# Patient Record
Sex: FEMALE | Race: ASIAN | NOT HISPANIC OR LATINO | URBAN - METROPOLITAN AREA
[De-identification: names, ages, dates, MRNs, and addresses within clinical notes are randomized per-mention and may not be internally consistent; named-entity substitution may affect disease eponyms.]

---

## 2018-03-24 ENCOUNTER — EMERGENCY (EMERGENCY)
Facility: HOSPITAL | Age: 38
LOS: 1 days | Discharge: HOME | End: 2018-03-24
Attending: EMERGENCY MEDICINE
Payer: COMMERCIAL

## 2018-03-24 VITALS
HEART RATE: 90 BPM | SYSTOLIC BLOOD PRESSURE: 108 MMHG | RESPIRATION RATE: 17 BRPM | OXYGEN SATURATION: 98 % | TEMPERATURE: 98 F | DIASTOLIC BLOOD PRESSURE: 73 MMHG

## 2018-03-24 VITALS
OXYGEN SATURATION: 98 % | RESPIRATION RATE: 18 BRPM | TEMPERATURE: 99 F | HEART RATE: 78 BPM | DIASTOLIC BLOOD PRESSURE: 87 MMHG | SYSTOLIC BLOOD PRESSURE: 134 MMHG

## 2018-03-24 LAB
ALBUMIN SERPL ELPH-MCNC: 3.8 G/DL — SIGNIFICANT CHANGE UP (ref 3.5–5)
ALP SERPL-CCNC: 82 U/L — SIGNIFICANT CHANGE UP (ref 40–120)
ALT FLD-CCNC: 32 U/L DA — SIGNIFICANT CHANGE UP (ref 10–60)
ANION GAP SERPL CALC-SCNC: 10 MMOL/L — SIGNIFICANT CHANGE UP (ref 5–17)
APPEARANCE UR: CLEAR — SIGNIFICANT CHANGE UP
AST SERPL-CCNC: 12 U/L — SIGNIFICANT CHANGE UP (ref 10–40)
BASOPHILS # BLD AUTO: 0.1 K/UL — SIGNIFICANT CHANGE UP (ref 0–0.2)
BASOPHILS NFR BLD AUTO: 0.7 % — SIGNIFICANT CHANGE UP (ref 0–2)
BILIRUB SERPL-MCNC: 0.3 MG/DL — SIGNIFICANT CHANGE UP (ref 0.2–1.2)
BILIRUB UR-MCNC: NEGATIVE — SIGNIFICANT CHANGE UP
BUN SERPL-MCNC: 12 MG/DL — SIGNIFICANT CHANGE UP (ref 7–18)
CALCIUM SERPL-MCNC: 8.9 MG/DL — SIGNIFICANT CHANGE UP (ref 8.4–10.5)
CHLORIDE SERPL-SCNC: 104 MMOL/L — SIGNIFICANT CHANGE UP (ref 96–108)
CO2 SERPL-SCNC: 23 MMOL/L — SIGNIFICANT CHANGE UP (ref 22–31)
COLOR SPEC: YELLOW — SIGNIFICANT CHANGE UP
CREAT SERPL-MCNC: 0.86 MG/DL — SIGNIFICANT CHANGE UP (ref 0.5–1.3)
DIFF PNL FLD: ABNORMAL
EOSINOPHIL # BLD AUTO: 0.3 K/UL — SIGNIFICANT CHANGE UP (ref 0–0.5)
EOSINOPHIL NFR BLD AUTO: 2.4 % — SIGNIFICANT CHANGE UP (ref 0–6)
GLUCOSE SERPL-MCNC: 148 MG/DL — HIGH (ref 70–99)
GLUCOSE UR QL: NEGATIVE — SIGNIFICANT CHANGE UP
HCG UR QL: NEGATIVE — SIGNIFICANT CHANGE UP
HCT VFR BLD CALC: 41.4 % — SIGNIFICANT CHANGE UP (ref 34.5–45)
HGB BLD-MCNC: 13.4 G/DL — SIGNIFICANT CHANGE UP (ref 11.5–15.5)
KETONES UR-MCNC: NEGATIVE — SIGNIFICANT CHANGE UP
LEUKOCYTE ESTERASE UR-ACNC: ABNORMAL
LIDOCAIN IGE QN: 90 U/L — SIGNIFICANT CHANGE UP (ref 73–393)
LYMPHOCYTES # BLD AUTO: 16.8 % — SIGNIFICANT CHANGE UP (ref 13–44)
LYMPHOCYTES # BLD AUTO: 2.1 K/UL — SIGNIFICANT CHANGE UP (ref 1–3.3)
MCHC RBC-ENTMCNC: 28.8 PG — SIGNIFICANT CHANGE UP (ref 27–34)
MCHC RBC-ENTMCNC: 32.4 GM/DL — SIGNIFICANT CHANGE UP (ref 32–36)
MCV RBC AUTO: 88.7 FL — SIGNIFICANT CHANGE UP (ref 80–100)
MONOCYTES # BLD AUTO: 0.6 K/UL — SIGNIFICANT CHANGE UP (ref 0–0.9)
MONOCYTES NFR BLD AUTO: 4.6 % — SIGNIFICANT CHANGE UP (ref 2–14)
NEUTROPHILS # BLD AUTO: 9.5 K/UL — HIGH (ref 1.8–7.4)
NEUTROPHILS NFR BLD AUTO: 75.4 % — SIGNIFICANT CHANGE UP (ref 43–77)
NITRITE UR-MCNC: NEGATIVE — SIGNIFICANT CHANGE UP
PH UR: 5 — SIGNIFICANT CHANGE UP (ref 5–8)
PLATELET # BLD AUTO: 280 K/UL — SIGNIFICANT CHANGE UP (ref 150–400)
POTASSIUM SERPL-MCNC: 4.1 MMOL/L — SIGNIFICANT CHANGE UP (ref 3.5–5.3)
POTASSIUM SERPL-SCNC: 4.1 MMOL/L — SIGNIFICANT CHANGE UP (ref 3.5–5.3)
PROT SERPL-MCNC: 8 G/DL — SIGNIFICANT CHANGE UP (ref 6–8.3)
PROT UR-MCNC: NEGATIVE — SIGNIFICANT CHANGE UP
RBC # BLD: 4.67 M/UL — SIGNIFICANT CHANGE UP (ref 3.8–5.2)
RBC # FLD: 11.6 % — SIGNIFICANT CHANGE UP (ref 10.3–14.5)
SODIUM SERPL-SCNC: 137 MMOL/L — SIGNIFICANT CHANGE UP (ref 135–145)
SP GR SPEC: 1 — LOW (ref 1.01–1.02)
UROBILINOGEN FLD QL: NEGATIVE — SIGNIFICANT CHANGE UP
WBC # BLD: 12.7 K/UL — HIGH (ref 3.8–10.5)
WBC # FLD AUTO: 12.7 K/UL — HIGH (ref 3.8–10.5)

## 2018-03-24 PROCEDURE — 99285 EMERGENCY DEPT VISIT HI MDM: CPT | Mod: 25

## 2018-03-24 RX ORDER — KETOROLAC TROMETHAMINE 30 MG/ML
30 SYRINGE (ML) INJECTION ONCE
Qty: 0 | Refills: 0 | Status: DISCONTINUED | OUTPATIENT
Start: 2018-03-24 | End: 2018-03-24

## 2018-03-24 RX ORDER — SODIUM CHLORIDE 9 MG/ML
3 INJECTION INTRAMUSCULAR; INTRAVENOUS; SUBCUTANEOUS ONCE
Qty: 0 | Refills: 0 | Status: COMPLETED | OUTPATIENT
Start: 2018-03-24 | End: 2018-03-24

## 2018-03-24 RX ADMIN — Medication 30 MILLIGRAM(S): at 22:41

## 2018-03-24 RX ADMIN — SODIUM CHLORIDE 3 MILLILITER(S): 9 INJECTION INTRAMUSCULAR; INTRAVENOUS; SUBCUTANEOUS at 22:41

## 2018-03-24 NOTE — ED PROVIDER NOTE - PHYSICAL EXAMINATION
General: pt lying in stretcher, appears stated age and is not in distress  HEENT: AT/NC, pink conjunctiva, anicteric sclerae, EOMI, PERRLA, TMs smooth, grey, intact, with normal landmarks, nasal mucosa pink, no discharge, turbinates not enlarged; moist mucus membranes, tongue well-papillated, good dentition; posterior pharynx shows no erythema or exudates;   Neck: supple, full ROM, trachea midline, no JVD, no cervical LAD, no midline ttp or stepoffs  Lungs: symmetric excursion, b/l clear vesicular breath sounds with no wheezes, crackles, or rhonchi  Heart: rrr, S1, S2 normal; no S3 or S4; no murmurs or rubs  Abd: normal bowel sounds; soft, nontender; negative McBurney's point tenderness, negative López's sign, no rebound, no guarding, spleen non-palpable; no hepatomegaly, no masses  Back: no midline spinal tenderness or stepoffs, no costovertebral angle tenderness  Extremities: no clubbing, cyanosis, or edema; no palpable deformities or fractures  Skin: good turgor; no rashes, petechiae, ecchymoses, or jaundice  Pulses: radial, posterior tibialis (PT), dorsalis pedis (DP) all 2+ & symmetric  Neuro: awake, alert, responsive; oriented to person, place and time; cranial nerves intact, EOMI, intact jaw movement, intact facial sensation, no facial asymmetry, hearing intact; no nystagmus, tongue midline; Motor: Normal tone in upper and lower extremities bilaterally strength 5/5; Sensory: intact to pinprick and light touch; Cerebellar: finger-to-nose intact; normal steady gait; negative Romberg’s sign; DTR: biceps, triceps, patellar, 2+, no pronator drift General: pt lying in stretcher, appears stated age and is uncomfortable due to pain  HEENT: AT/NC, pink conjunctiva, anicteric sclerae, EOMI, PERRLA, nasal mucosa pink, no discharge, turbinates not enlarged; moist mucus membranes, tongue well-papillated, good dentition; posterior pharynx shows no erythema or exudates;   Neck: supple, full ROM, trachea midline, no JVD, no cervical LAD, no midline ttp or stepoffs  Lungs: symmetric excursion, b/l clear vesicular breath sounds with no wheezes, crackles, or rhonchi  Heart: rrr, S1, S2 normal; no S3 or S4; no murmurs or rubs  Abd: normal bowel sounds; soft, nontender; negative McBurney's point tenderness, negative López's sign, no rebound, no guarding, spleen non-palpable; no hepatomegaly, no masses  Back: no midline spinal tenderness or stepoffs, positive right costovertebral angle tenderness  Extremities: no clubbing, cyanosis, or edema; no palpable deformities or fractures  Skin: good turgor; no rashes, petechiae, ecchymoses, or jaundice  Pulses: radial, posterior tibialis (PT), dorsalis pedis (DP) all 2+ & symmetric  Neuro: awake, alert, responsive; oriented to person, place and time; cranial nerves intact, EOMI, intact jaw movement, intact facial sensation, no facial asymmetry, hearing intact; no nystagmus, tongue midline; Motor: Normal tone in upper and lower extremities bilaterally strength 5/5; Sensory: intact; normal steady gait;

## 2018-03-24 NOTE — ED PROVIDER NOTE - OBJECTIVE STATEMENT
38 y/o female with PMHx of DM presents to the ED c/o R flank pain x yesterday afternoon. Pt notes gradual onset of R flank pain radiating to back, associated with nausea and episodes of difficulty urinating described as "urine slow to come out". Pt notes her Sx were initially relieved with Ibuprofen at home but progressively worsened, Sx now waxing and waning. Pt also notes development of dysuria, trace hematuria and urinary frequency. Pt denies vomiting, diarrhea, chills, fever, night sweats, abd pain, or any other complaints. Pt is currently a daily smoker. NKDA

## 2018-03-24 NOTE — ED ADULT NURSE NOTE - OBJECTIVE STATEMENT
Patient c/o stomach pain for last few days. States that is radiating from her RLQ to her lower back. Patient mentioned that her LMP was earlier this month. Patient says that she tends to have a heavy menses and uses about 6-7 pads a day. Patient denies any other complaints.

## 2018-03-24 NOTE — ED PROVIDER NOTE - MEDICAL DECISION MAKING DETAILS
38 yo f w/ aforementioned presentation concerning for nephrolithiasis versus pyelonephritis versus infected kidney stone. No clinical signs of acute abdomen. Will get UA, labs, give pain medication and CT abd/pelvis, monitor and reassess 38 yo f w/ aforementioned presentation concerning for nephrolithiasis versus pyelonephritis versus infected kidney stone. No clinical signs of acute abdomen. Will get UA, labs, give pain medication and CT abd/pelvis, monitor and reassess. Pt reassessed and markedly improved after treatment with toradol. CT Scan reviewed and reveals minimal right ureteritis which could be due to a passed stone versus an ascending infection. UA positive for infection. Pt informed of results and VS stable and patient reports she can follow up with her PMD next week. Pt stable for dc home w/ antibiotics.

## 2018-03-25 PROCEDURE — 83690 ASSAY OF LIPASE: CPT

## 2018-03-25 PROCEDURE — 81001 URINALYSIS AUTO W/SCOPE: CPT

## 2018-03-25 PROCEDURE — 80053 COMPREHEN METABOLIC PANEL: CPT

## 2018-03-25 PROCEDURE — 81025 URINE PREGNANCY TEST: CPT

## 2018-03-25 PROCEDURE — 85027 COMPLETE CBC AUTOMATED: CPT

## 2018-03-25 PROCEDURE — 96374 THER/PROPH/DIAG INJ IV PUSH: CPT

## 2018-03-25 PROCEDURE — 74176 CT ABD & PELVIS W/O CONTRAST: CPT

## 2018-03-25 PROCEDURE — 74176 CT ABD & PELVIS W/O CONTRAST: CPT | Mod: 26

## 2018-03-25 PROCEDURE — 99284 EMERGENCY DEPT VISIT MOD MDM: CPT | Mod: 25

## 2018-03-25 RX ORDER — IBUPROFEN 200 MG
1 TABLET ORAL
Qty: 30 | Refills: 0
Start: 2018-03-25

## 2018-03-25 RX ORDER — CIPROFLOXACIN LACTATE 400MG/40ML
500 VIAL (ML) INTRAVENOUS ONCE
Qty: 0 | Refills: 0 | Status: COMPLETED | OUTPATIENT
Start: 2018-03-25 | End: 2018-03-25

## 2018-03-25 RX ORDER — MOXIFLOXACIN HYDROCHLORIDE TABLETS, 400 MG 400 MG/1
1 TABLET, FILM COATED ORAL
Qty: 20 | Refills: 0
Start: 2018-03-25 | End: 2018-04-03

## 2018-03-25 RX ADMIN — Medication 500 MILLIGRAM(S): at 03:10

## 2018-03-25 RX ADMIN — Medication 30 MILLIGRAM(S): at 03:07

## 2019-10-13 ENCOUNTER — INPATIENT (INPATIENT)
Facility: HOSPITAL | Age: 39
LOS: 3 days | Discharge: HOME | DRG: 871 | End: 2019-10-17
Attending: INTERNAL MEDICINE | Admitting: INTERNAL MEDICINE
Payer: COMMERCIAL

## 2019-10-13 VITALS
HEART RATE: 104 BPM | HEIGHT: 62 IN | DIASTOLIC BLOOD PRESSURE: 83 MMHG | TEMPERATURE: 100 F | SYSTOLIC BLOOD PRESSURE: 114 MMHG | RESPIRATION RATE: 20 BRPM | OXYGEN SATURATION: 99 % | WEIGHT: 149.91 LBS

## 2019-10-13 DIAGNOSIS — E11.9 TYPE 2 DIABETES MELLITUS WITHOUT COMPLICATIONS: ICD-10-CM

## 2019-10-13 DIAGNOSIS — Z29.9 ENCOUNTER FOR PROPHYLACTIC MEASURES, UNSPECIFIED: ICD-10-CM

## 2019-10-13 DIAGNOSIS — K75.9 INFLAMMATORY LIVER DISEASE, UNSPECIFIED: ICD-10-CM

## 2019-10-13 LAB
ALBUMIN SERPL ELPH-MCNC: 3.2 G/DL — LOW (ref 3.5–5)
ALP SERPL-CCNC: 152 U/L — HIGH (ref 40–120)
ALT FLD-CCNC: 504 U/L DA — HIGH (ref 10–60)
ANION GAP SERPL CALC-SCNC: 8 MMOL/L — SIGNIFICANT CHANGE UP (ref 5–17)
APPEARANCE UR: CLEAR — SIGNIFICANT CHANGE UP
APTT BLD: 31.9 SEC — SIGNIFICANT CHANGE UP (ref 27.5–36.3)
AST SERPL-CCNC: 214 U/L — HIGH (ref 10–40)
BASOPHILS # BLD AUTO: 0.09 K/UL — SIGNIFICANT CHANGE UP (ref 0–0.2)
BASOPHILS NFR BLD AUTO: 0.9 % — SIGNIFICANT CHANGE UP (ref 0–2)
BILIRUB SERPL-MCNC: 0.6 MG/DL — SIGNIFICANT CHANGE UP (ref 0.2–1.2)
BILIRUB UR-MCNC: NEGATIVE — SIGNIFICANT CHANGE UP
BUN SERPL-MCNC: 5 MG/DL — LOW (ref 7–18)
CALCIUM SERPL-MCNC: 8.4 MG/DL — SIGNIFICANT CHANGE UP (ref 8.4–10.5)
CHLORIDE SERPL-SCNC: 101 MMOL/L — SIGNIFICANT CHANGE UP (ref 96–108)
CO2 SERPL-SCNC: 25 MMOL/L — SIGNIFICANT CHANGE UP (ref 22–31)
COLOR SPEC: YELLOW — SIGNIFICANT CHANGE UP
CREAT SERPL-MCNC: 0.74 MG/DL — SIGNIFICANT CHANGE UP (ref 0.5–1.3)
DIFF PNL FLD: NEGATIVE — SIGNIFICANT CHANGE UP
EOSINOPHIL # BLD AUTO: 1.09 K/UL — HIGH (ref 0–0.5)
EOSINOPHIL NFR BLD AUTO: 11.2 % — HIGH (ref 0–6)
GLUCOSE SERPL-MCNC: 196 MG/DL — HIGH (ref 70–99)
GLUCOSE UR QL: NEGATIVE — SIGNIFICANT CHANGE UP
HCG SERPL-ACNC: <1 MIU/ML — SIGNIFICANT CHANGE UP
HCT VFR BLD CALC: 38.6 % — SIGNIFICANT CHANGE UP (ref 34.5–45)
HGB BLD-MCNC: 12.7 G/DL — SIGNIFICANT CHANGE UP (ref 11.5–15.5)
IMM GRANULOCYTES NFR BLD AUTO: 0.5 % — SIGNIFICANT CHANGE UP (ref 0–1.5)
INR BLD: 1.57 RATIO — HIGH (ref 0.88–1.16)
KETONES UR-MCNC: NEGATIVE — SIGNIFICANT CHANGE UP
LACTATE SERPL-SCNC: 1.9 MMOL/L — SIGNIFICANT CHANGE UP (ref 0.7–2)
LEUKOCYTE ESTERASE UR-ACNC: NEGATIVE — SIGNIFICANT CHANGE UP
LYMPHOCYTES # BLD AUTO: 2.15 K/UL — SIGNIFICANT CHANGE UP (ref 1–3.3)
LYMPHOCYTES # BLD AUTO: 22.1 % — SIGNIFICANT CHANGE UP (ref 13–44)
MCHC RBC-ENTMCNC: 28.5 PG — SIGNIFICANT CHANGE UP (ref 27–34)
MCHC RBC-ENTMCNC: 32.9 GM/DL — SIGNIFICANT CHANGE UP (ref 32–36)
MCV RBC AUTO: 86.7 FL — SIGNIFICANT CHANGE UP (ref 80–100)
MONOCYTES # BLD AUTO: 0.77 K/UL — SIGNIFICANT CHANGE UP (ref 0–0.9)
MONOCYTES NFR BLD AUTO: 7.9 % — SIGNIFICANT CHANGE UP (ref 2–14)
NEUTROPHILS # BLD AUTO: 5.56 K/UL — SIGNIFICANT CHANGE UP (ref 1.8–7.4)
NEUTROPHILS NFR BLD AUTO: 57.4 % — SIGNIFICANT CHANGE UP (ref 43–77)
NITRITE UR-MCNC: NEGATIVE — SIGNIFICANT CHANGE UP
NRBC # BLD: 0 /100 WBCS — SIGNIFICANT CHANGE UP (ref 0–0)
PH UR: 6.5 — SIGNIFICANT CHANGE UP (ref 5–8)
PLATELET # BLD AUTO: 246 K/UL — SIGNIFICANT CHANGE UP (ref 150–400)
POTASSIUM SERPL-MCNC: 3.7 MMOL/L — SIGNIFICANT CHANGE UP (ref 3.5–5.3)
POTASSIUM SERPL-SCNC: 3.7 MMOL/L — SIGNIFICANT CHANGE UP (ref 3.5–5.3)
PROT SERPL-MCNC: 7.7 G/DL — SIGNIFICANT CHANGE UP (ref 6–8.3)
PROT UR-MCNC: NEGATIVE — SIGNIFICANT CHANGE UP
PROTHROM AB SERPL-ACNC: 17.7 SEC — HIGH (ref 10–12.9)
RBC # BLD: 4.45 M/UL — SIGNIFICANT CHANGE UP (ref 3.8–5.2)
RBC # FLD: 11.7 % — SIGNIFICANT CHANGE UP (ref 10.3–14.5)
SODIUM SERPL-SCNC: 134 MMOL/L — LOW (ref 135–145)
SP GR SPEC: 1.01 — SIGNIFICANT CHANGE UP (ref 1.01–1.02)
UROBILINOGEN FLD QL: NEGATIVE — SIGNIFICANT CHANGE UP
WBC # BLD: 9.71 K/UL — SIGNIFICANT CHANGE UP (ref 3.8–10.5)
WBC # FLD AUTO: 9.71 K/UL — SIGNIFICANT CHANGE UP (ref 3.8–10.5)

## 2019-10-13 PROCEDURE — 71045 X-RAY EXAM CHEST 1 VIEW: CPT | Mod: 26

## 2019-10-13 PROCEDURE — 76705 ECHO EXAM OF ABDOMEN: CPT | Mod: 26

## 2019-10-13 PROCEDURE — 99285 EMERGENCY DEPT VISIT HI MDM: CPT

## 2019-10-13 RX ORDER — GLUCAGON INJECTION, SOLUTION 0.5 MG/.1ML
1 INJECTION, SOLUTION SUBCUTANEOUS ONCE
Refills: 0 | Status: DISCONTINUED | OUTPATIENT
Start: 2019-10-13 | End: 2019-10-14

## 2019-10-13 RX ORDER — PIPERACILLIN AND TAZOBACTAM 4; .5 G/20ML; G/20ML
3.38 INJECTION, POWDER, LYOPHILIZED, FOR SOLUTION INTRAVENOUS EVERY 8 HOURS
Refills: 0 | Status: DISCONTINUED | OUTPATIENT
Start: 2019-10-13 | End: 2019-10-17

## 2019-10-13 RX ORDER — PIPERACILLIN AND TAZOBACTAM 4; .5 G/20ML; G/20ML
3.38 INJECTION, POWDER, LYOPHILIZED, FOR SOLUTION INTRAVENOUS ONCE
Refills: 0 | Status: COMPLETED | OUTPATIENT
Start: 2019-10-13 | End: 2019-10-13

## 2019-10-13 RX ORDER — PANTOPRAZOLE SODIUM 20 MG/1
40 TABLET, DELAYED RELEASE ORAL DAILY
Refills: 0 | Status: DISCONTINUED | OUTPATIENT
Start: 2019-10-13 | End: 2019-10-13

## 2019-10-13 RX ORDER — INSULIN LISPRO 100/ML
VIAL (ML) SUBCUTANEOUS EVERY 6 HOURS
Refills: 0 | Status: DISCONTINUED | OUTPATIENT
Start: 2019-10-13 | End: 2019-10-14

## 2019-10-13 RX ORDER — DEXTROSE 50 % IN WATER 50 %
25 SYRINGE (ML) INTRAVENOUS ONCE
Refills: 0 | Status: DISCONTINUED | OUTPATIENT
Start: 2019-10-13 | End: 2019-10-14

## 2019-10-13 RX ORDER — SODIUM CHLORIDE 9 MG/ML
2100 INJECTION INTRAMUSCULAR; INTRAVENOUS; SUBCUTANEOUS ONCE
Refills: 0 | Status: COMPLETED | OUTPATIENT
Start: 2019-10-13 | End: 2019-10-13

## 2019-10-13 RX ORDER — SODIUM CHLORIDE 9 MG/ML
1000 INJECTION, SOLUTION INTRAVENOUS
Refills: 0 | Status: DISCONTINUED | OUTPATIENT
Start: 2019-10-13 | End: 2019-10-14

## 2019-10-13 RX ORDER — DEXTROSE 50 % IN WATER 50 %
12.5 SYRINGE (ML) INTRAVENOUS ONCE
Refills: 0 | Status: DISCONTINUED | OUTPATIENT
Start: 2019-10-13 | End: 2019-10-14

## 2019-10-13 RX ORDER — DEXTROSE 50 % IN WATER 50 %
15 SYRINGE (ML) INTRAVENOUS ONCE
Refills: 0 | Status: DISCONTINUED | OUTPATIENT
Start: 2019-10-13 | End: 2019-10-14

## 2019-10-13 RX ORDER — INSULIN LISPRO 100/ML
VIAL (ML) SUBCUTANEOUS
Refills: 0 | Status: DISCONTINUED | OUTPATIENT
Start: 2019-10-13 | End: 2019-10-13

## 2019-10-13 RX ORDER — PANTOPRAZOLE SODIUM 20 MG/1
40 TABLET, DELAYED RELEASE ORAL
Refills: 0 | Status: DISCONTINUED | OUTPATIENT
Start: 2019-10-13 | End: 2019-10-17

## 2019-10-13 RX ORDER — IBUPROFEN 200 MG
200 TABLET ORAL THREE TIMES A DAY
Refills: 0 | Status: DISCONTINUED | OUTPATIENT
Start: 2019-10-13 | End: 2019-10-17

## 2019-10-13 RX ADMIN — PIPERACILLIN AND TAZOBACTAM 200 GRAM(S): 4; .5 INJECTION, POWDER, LYOPHILIZED, FOR SOLUTION INTRAVENOUS at 12:54

## 2019-10-13 RX ADMIN — SODIUM CHLORIDE 2100 MILLILITER(S): 9 INJECTION INTRAMUSCULAR; INTRAVENOUS; SUBCUTANEOUS at 12:55

## 2019-10-13 RX ADMIN — SODIUM CHLORIDE 2100 MILLILITER(S): 9 INJECTION INTRAMUSCULAR; INTRAVENOUS; SUBCUTANEOUS at 14:00

## 2019-10-13 NOTE — H&P ADULT - PROBLEM SELECTOR PLAN 3
IMPROVE VTE Individual Risk Assessment  RISK                                                                Points  [  ] Previous VTE                                                  3  [  ] Thrombophilia                                               2  [  ] Lower limb paralysis                                      2        (unable to hold up >15 seconds)    [  ] Current Cancer                                              2         (within 6 months)  [  ] Immobilization > 24 hrs                                1  [  ] ICU/CCU stay > 24 hours                              1  [  ] Age > 60                                                      1  IMPROVE VTE Score __0_______  No need of prophylaxis    IMPROVE Score 0-1: Low Risk, No VTE prophylaxis required for most patients, encourage ambulation.   IMPROVE Score 2-3: At risk, pharmacologic VTE prophylaxis is indicated for most patients (in the absence of a contraindication)  IMPROVE Score > or = 4: High Risk, pharmacologic VTE prophylaxis is indicated for most patients (in the absence of a contraindication)

## 2019-10-13 NOTE — ED ADULT NURSE NOTE - CHIEF COMPLAINT QUOTE
Fever x Tuesday while in [place]. Also RUQ abd pain. Pt had Sonogram. DX with Enlarged Liver. Returned home x yesterday. Need Re-view and F/U

## 2019-10-13 NOTE — H&P ADULT - ASSESSMENT
39 year old F with PMH of DM came to the ED with fever and RUQ abdominal pain that started 6 days ago.  Labs significant for ; ; . Elevated PT/INR,  US Abdomen shows no gallstones or pericholecystic fluid but 8 mm dilatation of CBD    Pt will be admitted to medicine for further management.

## 2019-10-13 NOTE — ED PROVIDER NOTE - OBJECTIVE STATEMENT
39 year old female with PMHx of NIDDM and no significant PSHx presents to the ED with c/o Upper Right Quadrant abd pain with fever, vomiting x1, and nausea x4 days. Pts spouse notes that they were recently in the [place] and had a large feast. As per spouse, the patient began to feel fever and generalized abd pain. While in the  patient visited the ED on October 10th in which an US was done that showed no gallstones or kidney stones. Patient had one alcoholic beverage the night before but is not a regular drinker and does not smoke. Denies diarrhea, dysuria, hematuria, or any other acute complaints. NKDA.

## 2019-10-13 NOTE — H&P ADULT - NSHPPHYSICALEXAM_GEN_ALL_CORE
ICU Vital Signs Last 24 Hrs  T(C): 37.7 (13 Oct 2019 20:00), Max: 38.9 (13 Oct 2019 12:09)  T(F): 99.8 (13 Oct 2019 20:00), Max: 102 (13 Oct 2019 12:09)  HR: 97 (13 Oct 2019 20:00) (92 - 104)  BP: 115/74 (13 Oct 2019 20:00) (100/60 - 115/74)  RR: 18 (13 Oct 2019 20:00) (18 - 20)  SpO2: 99% (13 Oct 2019 20:00) (99% - 100%)

## 2019-10-13 NOTE — ED PROVIDER NOTE - CLINICAL SUMMARY MEDICAL DECISION MAKING FREE TEXT BOX
Patient who recently traveled to the  with fever and RUQ pain and elevated liver enzymes. Will recheck labs, order US, symptom treatment and reassess.

## 2019-10-13 NOTE — ED ADULT TRIAGE NOTE - CHIEF COMPLAINT QUOTE
Fever x Tuesday while in [place]. Also RUQ abd pain. Pt had Sonogram. DX with Enlarged Liver. Returned home x yesterday. Need Re-view and F/U

## 2019-10-13 NOTE — H&P ADULT - PROBLEM SELECTOR PLAN 1
-p/w fever and RUQ pain and tenderness  -elevated LFTs ( ; ; )  -US Abdomen shows no gallstones or pericholecystic fluid but 8 mm dilatation of CBD  -DDx Acute Hepatitis vs cholangitis  -started on Zosyn  -f/u Blood Cx  -f/u MRCP  -f/u Hepatitis panel  -f/u Amylase, lipase  -NPO for now  -Motrin prn for pain relief and protonix for heartburn  -GI Dr. Cash consulted  -ID Dr. Whittington consulted -p/w fever and RUQ pain and tenderness  -elevated LFTs ( ; ; )  -US Abdomen shows no gallstones or pericholecystic fluid but 8 mm dilatation of CBD  -DDx Cholangitis or acute hepatitis  -started on Zosyn  -f/u Blood Cx  -f/u MRCP  -f/u Hepatitis panel  -f/u Amylase, lipase  -NPO for now, ADAT  -Motrin prn for pain relief and protonix for heartburn  -GI Dr. Cash consulted  -ID Dr. Whittington consulted -p/w fever and RUQ pain and tenderness  -elevated LFTs ( ; ; )  -US Abdomen shows no gallstones or pericholecystic fluid but 8 mm dilatation of CBD  -DDx Cholangitis or acute hepatitis  -started on Zosyn  -f/u Blood Cx  -f/u MRCP  -f/u Hepatitis panel  -f/u Amylase, lipase  -NPO for now, ADAT  -IV hydration  -Motrin prn for pain relief and protonix for heartburn  -GI Dr. Cash consulted  -ID Dr. Whittington consulted

## 2019-10-13 NOTE — ED PROVIDER NOTE - PROGRESS NOTE DETAILS
Informed Dr. García for admission--suspecting acute hepatitis.  Pt will need hepatitis panel and MRCP.

## 2019-10-13 NOTE — H&P ADULT - HISTORY OF PRESENT ILLNESS
39 year old F with PMH of DM came to the ED with fever and RUQ abdominal pain that started 6 days ago. Pt states that she went for a family visit to [place] the last week when she started spiking fever of 101-102 F everyday. 2 days later patient started feeling RUQ abdominal pain that is dull, progressively increasing, aggravated by movement like laughing, walking and coughing; ibuprofen did not help relieve the pain. The pain is not related to food intake. Pt went to ED in [place] and had 39 year old F with PMH of DM came to the ED with fever and RUQ abdominal pain that started 6 days ago. Pt states that she went for a family visit to [place] the last week when she started spiking fever of 101-102 F everyday. 2 days later patient started feeling RUQ abdominal pain that is dull, progressively increasing, aggravated by movement like laughing, walking and coughing; ibuprofen did not help relieve the pain. The pain is not related to food intake. Pt went to ED in [place] and got US Abdomen done which showed no GB stones but some pericholecystic fluid and LFTs were elevate. Pt wanted to get further workup done in [place]. Pt had 1 episode of vomiting on Monday. Pt denies nausea, changes is color of stool or urine, diarrhea/constipation, nausea, changes in urinary habits, myalgia, joint pains or rash.    In ED, pt spiked fever of 102 F and was code sepsis. 39 year old F with PMH of DM came to the ED with fever and RUQ abdominal pain that started 6 days ago. Pt states that she went for a family visit to [place] the last week when she started spiking fever of 101-102 F everyday. 2 days later patient started feeling RUQ abdominal pain that is dull, progressively increasing, non radiating, aggravated by movement like laughing, walking and coughing; ibuprofen did not help relieve the pain. The pain is not related to food intake. Pt went to ED in [place] and got US Abdomen done which showed no GB stones but some pericholecystic fluid and LFTs were elevate. Pt wanted to get further workup done in [place]. Pt had 1 episode of vomiting on Monday. Pt denies nausea, changes is color of stool or urine, diarrhea/constipation, nausea, changes in urinary habits, myalgia, joint pains or rash.    In ED, pt spiked fever of 102 F. 39 year old F with PMH of DM came to the ED with fever and RUQ abdominal pain that started 6 days ago. Pt states that she went for a family visit to [place] the last week when she started spiking fever of 101-102 F everyday. 2 days later patient started feeling RUQ abdominal pain that is dull, progressively increasing, non radiating, aggravated by movement like laughing, walking and coughing; ibuprofen did not help relieve the pain. The pain is not related to food intake though the symptoms started after feast during the festival which did consist of fatty food. Pt went to ED in [place] and got US Abdomen done which showed no GB stones but some pericholecystic fluid and LFTs were elevate. Pt wanted to get further workup done in [place]. Pt had 1 episode of vomiting on Monday. Pt denies nausea, changes is color of stool or urine, diarrhea/constipation, nausea, changes in urinary habits, myalgia, joint pains or rash.    In ED, pt spiked fever of 102 F. Labs significant for elevated LFTS with US showing dilated CBD with no stone vsible. She was given 1 dose of zosyn. GI Dr Cash was consulted, recommended MRCP for possible cholangitis, as well as hepatitis workup.

## 2019-10-14 ENCOUNTER — TRANSCRIPTION ENCOUNTER (OUTPATIENT)
Age: 39
End: 2019-10-14

## 2019-10-14 DIAGNOSIS — R74.0 NONSPECIFIC ELEVATION OF LEVELS OF TRANSAMINASE AND LACTIC ACID DEHYDROGENASE [LDH]: ICD-10-CM

## 2019-10-14 DIAGNOSIS — R10.11 RIGHT UPPER QUADRANT PAIN: ICD-10-CM

## 2019-10-14 DIAGNOSIS — K75.9 INFLAMMATORY LIVER DISEASE, UNSPECIFIED: ICD-10-CM

## 2019-10-14 LAB
24R-OH-CALCIDIOL SERPL-MCNC: 52 NG/ML — SIGNIFICANT CHANGE UP (ref 30–80)
ALBUMIN SERPL ELPH-MCNC: 3 G/DL — LOW (ref 3.5–5)
ALP SERPL-CCNC: 134 U/L — HIGH (ref 40–120)
ALT FLD-CCNC: 519 U/L DA — HIGH (ref 10–60)
AMYLASE P1 CFR SERPL: 16 U/L — LOW (ref 25–115)
ANION GAP SERPL CALC-SCNC: 6 MMOL/L — SIGNIFICANT CHANGE UP (ref 5–17)
ANISOCYTOSIS BLD QL: SLIGHT — SIGNIFICANT CHANGE UP
AST SERPL-CCNC: 317 U/L — HIGH (ref 10–40)
BASOPHILS # BLD AUTO: 0.15 K/UL — SIGNIFICANT CHANGE UP (ref 0–0.2)
BASOPHILS NFR BLD AUTO: 2 % — SIGNIFICANT CHANGE UP (ref 0–2)
BILIRUB SERPL-MCNC: 0.6 MG/DL — SIGNIFICANT CHANGE UP (ref 0.2–1.2)
BUN SERPL-MCNC: 6 MG/DL — LOW (ref 7–18)
CALCIUM SERPL-MCNC: 8 MG/DL — LOW (ref 8.4–10.5)
CHLORIDE SERPL-SCNC: 107 MMOL/L — SIGNIFICANT CHANGE UP (ref 96–108)
CO2 SERPL-SCNC: 27 MMOL/L — SIGNIFICANT CHANGE UP (ref 22–31)
CREAT SERPL-MCNC: 0.65 MG/DL — SIGNIFICANT CHANGE UP (ref 0.5–1.3)
CULTURE RESULTS: SIGNIFICANT CHANGE UP
EOSINOPHIL # BLD AUTO: 0.99 K/UL — HIGH (ref 0–0.5)
EOSINOPHIL NFR BLD AUTO: 13 % — HIGH (ref 0–6)
FOLATE SERPL-MCNC: 14.9 NG/ML — SIGNIFICANT CHANGE UP
GIANT PLATELETS BLD QL SMEAR: PRESENT — SIGNIFICANT CHANGE UP
GLUCOSE BLDC GLUCOMTR-MCNC: 104 MG/DL — HIGH (ref 70–99)
GLUCOSE BLDC GLUCOMTR-MCNC: 110 MG/DL — HIGH (ref 70–99)
GLUCOSE BLDC GLUCOMTR-MCNC: 118 MG/DL — HIGH (ref 70–99)
GLUCOSE BLDC GLUCOMTR-MCNC: 199 MG/DL — HIGH (ref 70–99)
GLUCOSE BLDC GLUCOMTR-MCNC: 99 MG/DL — SIGNIFICANT CHANGE UP (ref 70–99)
GLUCOSE SERPL-MCNC: 102 MG/DL — HIGH (ref 70–99)
HAV IGM SER-ACNC: SIGNIFICANT CHANGE UP
HBA1C BLD-MCNC: 7.4 % — HIGH (ref 4–5.6)
HBV CORE IGM SER-ACNC: SIGNIFICANT CHANGE UP
HBV SURFACE AG SER-ACNC: SIGNIFICANT CHANGE UP
HCT VFR BLD CALC: 38.4 % — SIGNIFICANT CHANGE UP (ref 34.5–45)
HCV AB S/CO SERPL IA: 0.15 S/CO — SIGNIFICANT CHANGE UP (ref 0–0.99)
HCV AB SERPL-IMP: SIGNIFICANT CHANGE UP
HGB BLD-MCNC: 12.5 G/DL — SIGNIFICANT CHANGE UP (ref 11.5–15.5)
LIDOCAIN IGE QN: 52 U/L — LOW (ref 73–393)
LYMPHOCYTES # BLD AUTO: 1.9 K/UL — SIGNIFICANT CHANGE UP (ref 1–3.3)
LYMPHOCYTES # BLD AUTO: 25 % — SIGNIFICANT CHANGE UP (ref 13–44)
MACROCYTES BLD QL: SLIGHT — SIGNIFICANT CHANGE UP
MANUAL SMEAR VERIFICATION: SIGNIFICANT CHANGE UP
MCHC RBC-ENTMCNC: 28.5 PG — SIGNIFICANT CHANGE UP (ref 27–34)
MCHC RBC-ENTMCNC: 32.6 GM/DL — SIGNIFICANT CHANGE UP (ref 32–36)
MCV RBC AUTO: 87.7 FL — SIGNIFICANT CHANGE UP (ref 80–100)
MICROCYTES BLD QL: SLIGHT — SIGNIFICANT CHANGE UP
MONOCYTES # BLD AUTO: 0.53 K/UL — SIGNIFICANT CHANGE UP (ref 0–0.9)
MONOCYTES NFR BLD AUTO: 7 % — SIGNIFICANT CHANGE UP (ref 2–14)
NEUTROPHILS # BLD AUTO: 3.73 K/UL — SIGNIFICANT CHANGE UP (ref 1.8–7.4)
NEUTROPHILS NFR BLD AUTO: 48 % — SIGNIFICANT CHANGE UP (ref 43–77)
NEUTS BAND # BLD: 1 % — SIGNIFICANT CHANGE UP (ref 0–8)
NRBC # BLD: 0 /100 — SIGNIFICANT CHANGE UP (ref 0–0)
PLAT MORPH BLD: NORMAL — SIGNIFICANT CHANGE UP
PLATELET # BLD AUTO: 267 K/UL — SIGNIFICANT CHANGE UP (ref 150–400)
POIKILOCYTOSIS BLD QL AUTO: SLIGHT — SIGNIFICANT CHANGE UP
POLYCHROMASIA BLD QL SMEAR: SLIGHT — SIGNIFICANT CHANGE UP
POTASSIUM SERPL-MCNC: 3.5 MMOL/L — SIGNIFICANT CHANGE UP (ref 3.5–5.3)
POTASSIUM SERPL-SCNC: 3.5 MMOL/L — SIGNIFICANT CHANGE UP (ref 3.5–5.3)
PROT SERPL-MCNC: 7.2 G/DL — SIGNIFICANT CHANGE UP (ref 6–8.3)
RBC # BLD: 4.38 M/UL — SIGNIFICANT CHANGE UP (ref 3.8–5.2)
RBC # FLD: 12 % — SIGNIFICANT CHANGE UP (ref 10.3–14.5)
RBC BLD AUTO: ABNORMAL
SMUDGE CELLS # BLD: PRESENT — SIGNIFICANT CHANGE UP
SODIUM SERPL-SCNC: 140 MMOL/L — SIGNIFICANT CHANGE UP (ref 135–145)
SPECIMEN SOURCE: SIGNIFICANT CHANGE UP
TSH SERPL-MCNC: 1.95 UU/ML — SIGNIFICANT CHANGE UP (ref 0.34–4.82)
VARIANT LYMPHS # BLD: 4 % — SIGNIFICANT CHANGE UP (ref 0–6)
VIT B12 SERPL-MCNC: >2000 PG/ML — HIGH (ref 232–1245)
WBC # BLD: 7.61 K/UL — SIGNIFICANT CHANGE UP (ref 3.8–10.5)
WBC # FLD AUTO: 7.61 K/UL — SIGNIFICANT CHANGE UP (ref 3.8–10.5)

## 2019-10-14 PROCEDURE — 74183 MRI ABD W/O CNTR FLWD CNTR: CPT | Mod: 26

## 2019-10-14 PROCEDURE — 99232 SBSQ HOSP IP/OBS MODERATE 35: CPT

## 2019-10-14 RX ORDER — INSULIN LISPRO 100/ML
VIAL (ML) SUBCUTANEOUS
Refills: 0 | Status: DISCONTINUED | OUTPATIENT
Start: 2019-10-14 | End: 2019-10-17

## 2019-10-14 RX ORDER — SODIUM CHLORIDE 9 MG/ML
1000 INJECTION INTRAMUSCULAR; INTRAVENOUS; SUBCUTANEOUS
Refills: 0 | Status: DISCONTINUED | OUTPATIENT
Start: 2019-10-14 | End: 2019-10-14

## 2019-10-14 RX ORDER — SODIUM CHLORIDE 9 MG/ML
1000 INJECTION, SOLUTION INTRAVENOUS
Refills: 0 | Status: DISCONTINUED | OUTPATIENT
Start: 2019-10-14 | End: 2019-10-17

## 2019-10-14 RX ORDER — LANOLIN ALCOHOL/MO/W.PET/CERES
3 CREAM (GRAM) TOPICAL AT BEDTIME
Refills: 0 | Status: DISCONTINUED | OUTPATIENT
Start: 2019-10-14 | End: 2019-10-17

## 2019-10-14 RX ORDER — INSULIN LISPRO 100/ML
VIAL (ML) SUBCUTANEOUS AT BEDTIME
Refills: 0 | Status: DISCONTINUED | OUTPATIENT
Start: 2019-10-14 | End: 2019-10-17

## 2019-10-14 RX ORDER — DEXTROSE 50 % IN WATER 50 %
12.5 SYRINGE (ML) INTRAVENOUS ONCE
Refills: 0 | Status: DISCONTINUED | OUTPATIENT
Start: 2019-10-14 | End: 2019-10-17

## 2019-10-14 RX ORDER — DEXTROSE 50 % IN WATER 50 %
25 SYRINGE (ML) INTRAVENOUS ONCE
Refills: 0 | Status: DISCONTINUED | OUTPATIENT
Start: 2019-10-14 | End: 2019-10-17

## 2019-10-14 RX ORDER — GLUCAGON INJECTION, SOLUTION 0.5 MG/.1ML
1 INJECTION, SOLUTION SUBCUTANEOUS ONCE
Refills: 0 | Status: DISCONTINUED | OUTPATIENT
Start: 2019-10-14 | End: 2019-10-17

## 2019-10-14 RX ORDER — SODIUM CHLORIDE 9 MG/ML
1000 INJECTION INTRAMUSCULAR; INTRAVENOUS; SUBCUTANEOUS
Refills: 0 | Status: DISCONTINUED | OUTPATIENT
Start: 2019-10-14 | End: 2019-10-17

## 2019-10-14 RX ORDER — DEXTROSE 50 % IN WATER 50 %
15 SYRINGE (ML) INTRAVENOUS ONCE
Refills: 0 | Status: DISCONTINUED | OUTPATIENT
Start: 2019-10-14 | End: 2019-10-17

## 2019-10-14 RX ADMIN — PIPERACILLIN AND TAZOBACTAM 25 GRAM(S): 4; .5 INJECTION, POWDER, LYOPHILIZED, FOR SOLUTION INTRAVENOUS at 08:14

## 2019-10-14 RX ADMIN — SODIUM CHLORIDE 50 MILLILITER(S): 9 INJECTION INTRAMUSCULAR; INTRAVENOUS; SUBCUTANEOUS at 04:00

## 2019-10-14 RX ADMIN — Medication 200 MILLIGRAM(S): at 01:00

## 2019-10-14 RX ADMIN — PIPERACILLIN AND TAZOBACTAM 25 GRAM(S): 4; .5 INJECTION, POWDER, LYOPHILIZED, FOR SOLUTION INTRAVENOUS at 00:08

## 2019-10-14 RX ADMIN — Medication 200 MILLIGRAM(S): at 00:10

## 2019-10-14 RX ADMIN — PIPERACILLIN AND TAZOBACTAM 25 GRAM(S): 4; .5 INJECTION, POWDER, LYOPHILIZED, FOR SOLUTION INTRAVENOUS at 23:38

## 2019-10-14 RX ADMIN — PIPERACILLIN AND TAZOBACTAM 25 GRAM(S): 4; .5 INJECTION, POWDER, LYOPHILIZED, FOR SOLUTION INTRAVENOUS at 17:24

## 2019-10-14 RX ADMIN — Medication 3 MILLIGRAM(S): at 23:38

## 2019-10-14 RX ADMIN — SODIUM CHLORIDE 100 MILLILITER(S): 9 INJECTION, SOLUTION INTRAVENOUS at 00:08

## 2019-10-14 RX ADMIN — Medication 200 MILLIGRAM(S): at 17:54

## 2019-10-14 RX ADMIN — Medication 200 MILLIGRAM(S): at 17:24

## 2019-10-14 NOTE — DISCHARGE NOTE PROVIDER - HOSPITAL COURSE
39  year old F with PMH of DM came to the ED with fever and RUQ abdominal pain that started 10/7/19, 6 days ago. Pt states that she went for a family visit to [place] the last week when she started spiking fever of 101-102 F everyday. 2 days later patient started feeling RUQ abdominal pain that is dull, progressively increasing, non radiating, aggravated by movement like laughing, walking and coughing; ibuprofen did not help relieve the pain.      Labs significant for elevated LFTS with US showing dilated CBD with no stone visible    Treated with Zosyn X ? days. MRCP showed ?            (INCOMPLETE) 39  year old F with PMH of DM came to the ED with fever and RUQ abdominal pain that started 10/7/19, 6 days ago. Pt states that she went for a family visit to [place] the last week when she started spiking fever of 101-102 F everyday. 2 days later patient started feeling RUQ abdominal pain that is dull, progressively increasing, non radiating, aggravated by movement like laughing, walking and coughing; ibuprofen did not help relieve the pain.      Labs significant for elevated LFTS with US showing dilated CBD with no stone visible    Treated with Zosyn X ? days. MRCP showed ?            (INCOMPLETE) 39 year old F with PMH of DM , and recent travel to [place], who presented to ED with fever and RUQ abdominal pain x 6 days, and pericholecystic fluid with elevated LFT's per workup in [place]. In ED, US abdomen shows dilated CBD with no stone , and labs significant for transaminitis. Pt was started on Zosyn for cholangitis vs acute hepatitis. Acute Hepatitis panel non-reactive. MRCP shows Small volume upper abdominal ascites, of uncertain etiology.Dilated common bile duct, measuring 0.9 cm. No cholelithiasis or choledocholithiasis. CT abd/pelvis with IV contrast shows Small pulmonary consolidation in the medial right middle lobe with air bronchogram may represent pneumonia. Follow-up chest CT in 4-6 weeks is recommended to ensure resolution. Hepatic steatosis and hepatomegaly again noted. Mild undulating contour of the liver may represent cirrhosis. Clinical correlation is recommended. Splenomegaly and mild ascites. GI and ID  following .                     (INCOMPLETE) 39 year old F with PMH of DM , and recent travel to [place], who presented to ED with fever and RUQ abdominal pain x 6 days, and pericholecystic fluid with elevated LFT's per workup in [place]. In ED, US abdomen shows dilated CBD with no stone , and labs significant for transaminitis. Pt was started on Zosyn for cholangitis vs acute hepatitis. Acute Hepatitis panel non-reactive. MRCP shows Small volume upper abdominal ascites, of uncertain etiology.Dilated common bile duct, measuring 0.9 cm. No cholelithiasis or choledocholithiasis. CT abd/pelvis with IV contrast shows Small pulmonary consolidation in the medial right middle lobe with air bronchogram may represent pneumonia. Hepatic steatosis and hepatomegaly again noted. Mild undulating contour of the liver may represent cirrhosis. CT chest was done and it did not show any pneumonia    Clinical correlation is recommended. Splenomegaly and mild ascites. GI and ID  following .  started on PO diet and pt was tolerating     HIDA scan was completed and showed Normal hepatobiliary scan with No scan evidence of acute cholecystitis.     no clear etiology for transaminitis,  abdominal pain resolved, tolerating PO diet     medically stable for discharge             < end of copied text > 39 year old F with PMH of DM , and recent travel to [place], who presented to ED with fever and RUQ abdominal pain x 6 days, and pericholecystic fluid with elevated LFT's per workup in [place]. In ED, US abdomen shows dilated CBD with no stone , and labs significant for transaminitis. Pt was started on Zosyn for cholangitis vs acute hepatitis. Acute Hepatitis panel non-reactive. MRCP shows Small volume upper abdominal ascites, of uncertain etiology.Dilated common bile duct, measuring 0.9 cm. No cholelithiasis or choledocholithiasis. CT abd/pelvis with IV contrast shows Small pulmonary consolidation in the medial right middle lobe with air bronchogram may represent pneumonia. Hepatic steatosis and hepatomegaly again noted. Mild undulating contour of the liver may represent cirrhosis. CT chest was done and it did not show any pneumonia    Clinical correlation is recommended. Splenomegaly and mild ascites. GI and ID  following .  started on PO diet and pt was tolerating     HIDA scan was completed and showed Normal hepatobiliary scan with No scan evidence of acute cholecystitis.     no clear etiology for transaminitis,  abdominal pain resolved, tolerating PO diet     medically stable for discharge with out pt follow up with hepatology

## 2019-10-14 NOTE — CHART NOTE - NSCHARTNOTEFT_GEN_A_CORE
EVENT:     OBJECTIVE:  Vital Signs Last 24 Hrs  T(C): 36.7 (14 Oct 2019 20:17), Max: 37.7 (14 Oct 2019 13:55)  T(F): 98 (14 Oct 2019 20:17), Max: 99.8 (14 Oct 2019 13:55)  HR: 79 (14 Oct 2019 20:17) (65 - 79)  BP: 107/67 (14 Oct 2019 20:17) (100/65 - 117/68)  BP(mean): --  RR: 17 (14 Oct 2019 20:17) (16 - 17)  SpO2: 98% (14 Oct 2019 20:17) (97% - 98%)    FOCUSED PHYSICAL EXAM:      GENERAL: NAD, well-developed  HEAD:  Atraumatic, Normocephalic  EYES: EOMI, PERRLA, conjunctiva and sclera clear  NECK: Supple, No JVD  CHEST/LUNG: Clear to auscultation bilaterally; No wheezes or rales  HEART: Regular rate and rhythm; No murmurs, rubs, or gallops  ABDOMEN: Soft, Nontender, Nondistended, Normal bowel sounds  EXTREMITIES:  2+ Peripheral Pulses, No clubbing, cyanosis, or edema  Neurological: AOx3, CN 2-12 grossly intact  Skin: Warm and dry  Musculoskeletal: Atraumatic, no joint effusions  Psychiatric: Normal affect  LABS:                        12.5   7.61  )-----------( 267      ( 14 Oct 2019 08:46 )             38.4     10-14    140  |  107  |  6<L>  ----------------------------<  102<H>  3.5   |  27  |  0.65    Ca    8.0<L>      14 Oct 2019 08:46    TPro  7.2  /  Alb  3.0<L>  /  TBili  0.6  /  DBili  x   /  AST  317<H>  /  ALT  519<H>  /  AlkPhos  134<H>  10-14      EKG:   IMGAGING:    ASSESSMENT:  HPI:  39 year old F with PMH of DM came to the ED with fever and RUQ abdominal pain that started 6 days ago. Pt states that she went for a family visit to [place] the last week when she started spiking fever of 101-102 F everyday. 2 days later patient started feeling RUQ abdominal pain that is dull, progressively increasing, non radiating, aggravated by movement like laughing, walking and coughing; ibuprofen did not help relieve the pain. The pain is not related to food intake though the symptoms started after feast during the festival which did consist of fatty food. Pt went to ED in [place] and got US Abdomen done which showed no GB stones but some pericholecystic fluid and LFTs were elevate. Pt wanted to get further [place]up done in [place]. Pt had 1 episode of vomiting on Monday. Pt denies nausea, changes is color of stool or urine, diarrhea/constipation, nausea, changes in urinary habits, myalgia, joint pains or rash.    In ED, pt spiked fever of 102 F. Labs significant for elevated LFTS with US showing dilated CBD with no stone vsible. She was given 1 dose of zosyn. GI Dr Cash was consulted, recommended MRCP for possible cholangitis, as well as hepatitis workup. (13 Oct 2019 19:37)      PLAN:     1. melatonin 3 milliGRAM(s) Oral at bedtime EVENT: Pt requesting medication to help with sleep    OBJECTIVE:  Vital Signs Last 24 Hrs  T(C): 36.7 (14 Oct 2019 20:17), Max: 37.7 (14 Oct 2019 13:55)  T(F): 98 (14 Oct 2019 20:17), Max: 99.8 (14 Oct 2019 13:55)  HR: 79 (14 Oct 2019 20:17) (65 - 79)  BP: 107/67 (14 Oct 2019 20:17) (100/65 - 117/68)  BP(mean): --  RR: 17 (14 Oct 2019 20:17) (16 - 17)  SpO2: 98% (14 Oct 2019 20:17) (97% - 98%)    FOCUSED PHYSICAL EXAM:  GENERAL: NAD, well-developed  HEAD:  Atraumatic, Normocephalic  EYES: EOMI, PERRLA, conjunctiva and sclera clear  NECK: Supple, No JVD  CHEST/LUNG: Clear to auscultation bilaterally; No wheezes or rales  HEART: Regular rate and rhythm; No murmurs, rubs, or gallops  ABDOMEN: Soft, Nontender, Nondistended, Normal bowel sounds  EXTREMITIES:  2+ Peripheral Pulses, No clubbing, cyanosis, or edema, AROM  Neurological: AOx3  Skin: Warm and dry  Psychiatric: Normal affect  LABS:                        12.5   7.61  )-----------( 267      ( 14 Oct 2019 08:46 )             38.4     10-14    140  |  107  |  6<L>  ----------------------------<  102<H>  3.5   |  27  |  0.65    Ca    8.0<L>      14 Oct 2019 08:46    TPro  7.2  /  Alb  3.0<L>  /  TBili  0.6  /  DBili  x   /  AST  317<H>  /  ALT  519<H>  /  AlkPhos  134<H>  10-14    ASSESSMENT:  HPI:  39 year old F with PMH of DM came to the ED with fever and RUQ abdominal pain that started 6 days ago. Pt states that she went for a family visit to [place] the last week when she started spiking fever of 101-102 F everyday. 2 days later patient started feeling RUQ abdominal pain that is dull, progressively increasing, non radiating, aggravated by movement like laughing, walking and coughing; ibuprofen did not help relieve the pain. The pain is not related to food intake though the symptoms started after feast during the festival which did consist of fatty food. Pt went to ED in [place] and got US Abdomen done which showed no GB stones but some pericholecystic fluid and LFTs were elevate. Pt wanted to get [place]up [place]. Pt had 1 episode of vomiting on Monday. Pt denies nausea, changes is color of stool or urine, diarrhea/constipation, nausea, changes in urinary habits, myalgia, joint pains or rash.  S/P MRCP 10/14 -  small volume upper abdominal ascites, of uncertain etiology. Consider, further evaluation with contrast enhanced CT abdomen and pelvis. Dilated common bile duct, measuring 0.9 cm. No cholelithiasis or choledocholithiasis.    PLAN:   1. Melatonin 3 milliGRAM(s) Oral at bedtime ordered  2. Modify environment to facilitate sleep EVENT: Pt requesting medication to help with sleep    OBJECTIVE:  Vital Signs Last 24 Hrs  T(C): 36.7 (14 Oct 2019 20:17), Max: 37.7 (14 Oct 2019 13:55)  T(F): 98 (14 Oct 2019 20:17), Max: 99.8 (14 Oct 2019 13:55)  HR: 79 (14 Oct 2019 20:17) (65 - 79)  BP: 107/67 (14 Oct 2019 20:17) (100/65 - 117/68)  BP(mean): --  RR: 17 (14 Oct 2019 20:17) (16 - 17)  SpO2: 98% (14 Oct 2019 20:17) (97% - 98%)    FOCUSED PHYSICAL EXAM:  GENERAL: NAD, well-developed  HEAD:  Atraumatic, Normocephalic  EYES: EOMI, PERRLA, conjunctiva and sclera clear  NECK: Supple, No JVD  CHEST/LUNG: Clear to auscultation bilaterally; No wheezes or rales  HEART: Regular rate and rhythm; No murmurs, rubs, or gallops  ABDOMEN: Soft, Nontender, Nondistended, Normal bowel sounds  EXTREMITIES:  2+ Peripheral Pulses, No clubbing, cyanosis, or edema, AROM  Neurological: AOx3  Skin: Warm and dry  Psychiatric: Normal affect  LABS:                        12.5   7.61  )-----------( 267      ( 14 Oct 2019 08:46 )             38.4     10-14    140  |  107  |  6<L>  ----------------------------<  102<H>  3.5   |  27  |  0.65    Ca    8.0<L>      14 Oct 2019 08:46    TPro  7.2  /  Alb  3.0<L>  /  TBili  0.6  /  DBili  x   /  AST  317<H>  /  ALT  519<H>  /  AlkPhos  134<H>  10-14    ASSESSMENT:  HPI:  39 year old F with PMH of DM came to the ED with fever and RUQ abdominal pain that started 6 days ago. Pt states that she went for a family visit to [place] the last week when she started spiking fever of 101-102 F everyday. 2 days later patient started feeling RUQ abdominal pain that is dull, progressively increasing, non radiating, aggravated by movement like laughing, walking and coughing; ibuprofen did not help relieve the pain. The pain is not related to food intake though the symptoms started after feast during the festival which did consist of fatty food. Pt went to ED in [place] and got US Abdomen done which showed no GB stones but some pericholecystic fluid and LFTs were elevate. Pt wanted to [place]. Pt had 1 episode of vomiting on Monday. Pt denies nausea, changes is color of stool or urine, diarrhea/constipation, nausea, changes in urinary habits, myalgia, joint pains or rash.  S/P MRCP 10/14 -  small volume upper abdominal ascites, of uncertain etiology. Consider, further evaluation with contrast enhanced CT abdomen and pelvis. Dilated common bile duct, measuring 0.9 cm. No cholelithiasis or choledocholithiasis.    PLAN:   Problem: Insomnia probably related to hospitalization, no sleeping med PTA  1. Melatonin 3 micky GRAM(s) Oral at bedtime ordered  2. Modify environment to facilitate sleep

## 2019-10-14 NOTE — DISCHARGE NOTE PROVIDER - NSDCCPCAREPLAN_GEN_ALL_CORE_FT
PRINCIPAL DISCHARGE DIAGNOSIS  Diagnosis: Acute cholangitis  Assessment and Plan of Treatment: Imagings show cholangitis ,No cholelithiasis or choledocholithiasis.  You received course of IV antibiotics.  Continue with antibiotics as prescribed by your doctor.  Maintain adequate hydration, and nutrition, rest, and activity as tolerated.   Avoid fatty foods.  Follow-up with your primary care physician within 1 week. Call for appointment.        SECONDARY DISCHARGE DIAGNOSES  Diagnosis: Transaminitis  Assessment and Plan of Treatment: Avoid hepato-toxic agents .  Avoid fatty foods.  Follow-up with your primary care physician within 1 week. Call for appointment.       Diagnosis: Right upper quadrant abdominal pain  Assessment and Plan of Treatment: Continue with pain regimen.  Avoid hepatotoxic agents., such as acetaminophen.  Follow-up with your primary care physician within 1 week. Call for appointment.      Diagnosis: DM (diabetes mellitus)  Assessment and Plan of Treatment: HgA1C this admission 7.4  Make sure you get your HgA1c checked every three months.  If you take oral diabetes medications, check your blood glucose two times a day.  If you take insulin, check your blood glucose before meals and at bedtime.  It's important not to skip any meals.  Keep a log of your blood glucose results and always take it with you to your doctor appointments.  Keep a list of your current medications including injectables and over the counter medications and bring this medication list with you to all your doctor appointments.  If you have not seen your ophthalmologist this year call for appointment.  Check your feet daily for redness, sores, or openings. Do not self treat. If no improvement in two days call your primary care physician for an appointment.  Low blood sugar (hypoglycemia) is a blood sugar below 70mg/dl. Check your blood sugar if you feel signs/symptoms of hypoglycemia. If your blood sugar is below 70 take 15 grams of carbohydrates (ex 4 oz of apple juice, 3-4 glucose tablets, or 4-6 oz of regular soda) wait 15 minutes and repeat blood sugar to make sure it comes up above 70.  If your blood sugar is above 70 and you are due for a meal, have a meal.  If you are not due for a meal have a snack.  This snack helps keeps your blood sugar at a safe range. PRINCIPAL DISCHARGE DIAGNOSIS  Diagnosis: Acute cholangitis  Assessment and Plan of Treatment: Imagings show cholangitis ,No cholelithiasis or choledocholithiasis.  You received course of IV antibiotics.  Continue with antibiotics as prescribed by your doctor.  Maintain adequate hydration, and nutrition, rest, and activity as tolerated.   Avoid fatty foods.  your liver enzymes stayed elevated, CT scam MRI and nuclear hepatic scan did not show any sallstones  FOLLOW UP WITH HEPATOLOGIST ON MONDAY   Follow-up with your primary care physician within 1 week. Call for appointment.        SECONDARY DISCHARGE DIAGNOSES  Diagnosis: DM (diabetes mellitus)  Assessment and Plan of Treatment: HgA1C this admission 7.4  Make sure you get your HgA1c checked every three months.  If you take oral diabetes medications, check your blood glucose two times a day.  If you take insulin, check your blood glucose before meals and at bedtime.  It's important not to skip any meals.  Keep a log of your blood glucose results and always take it with you to your doctor appointments.  Keep a list of your current medications including injectables and over the counter medications and bring this medication list with you to all your doctor appointments.  If you have not seen your ophthalmologist this year call for appointment.  Check your feet daily for redness, sores, or openings. Do not self treat. If no improvement in two days call your primary care physician for an appointment.  Low blood sugar (hypoglycemia) is a blood sugar below 70mg/dl. Check your blood sugar if you feel signs/symptoms of hypoglycemia. If your blood sugar is below 70 take 15 grams of carbohydrates (ex 4 oz of apple juice, 3-4 glucose tablets, or 4-6 oz of regular soda) wait 15 minutes and repeat blood sugar to make sure it comes up above 70.  If your blood sugar is above 70 and you are due for a meal, have a meal.  If you are not due for a meal have a snack.  This snack helps keeps your blood sugar at a safe range.      Diagnosis: Transaminitis  Assessment and Plan of Treatment: Avoid hepato-toxic agents .  Avoid fatty foods.  Follow-up with hepatologist on monday       Diagnosis: Right upper quadrant abdominal pain  Assessment and Plan of Treatment: Continue with pain regimen.  Avoid hepatotoxic agents., such as acetaminophen.  Follow-up with your primary care physician within 1 week. Call for appointment.

## 2019-10-14 NOTE — PROGRESS NOTE ADULT - SUBJECTIVE AND OBJECTIVE BOX
Patient is a 39y old  Female who presents with a chief complaint of Fever and RUQ abdominal pain X 6 days (14 Oct 2019 05:06)    pt seen in icu [  ], reg med floor [   ], bed [  ], chair at bedside [   ], a+o x3 [  ], lethargic [  ],  nad [  ]    felder [  ], ngt [  ], peg [  ], et tube [  ], cent line [  ], picc line [  ]        Allergies    No Known Allergies        Vitals    T(F): 97.4 (10-14-19 @ 05:00), Max: 102 (10-13-19 @ 12:09)  HR: 65 (10-14-19 @ 05:00) (65 - 104)  BP: 100/65 (10-14-19 @ 05:00) (100/60 - 115/74)  RR: 16 (10-14-19 @ 05:00) (16 - 20)  SpO2: 97% (10-14-19 @ 05:00) (97% - 100%)  Wt(kg): --  CAPILLARY BLOOD GLUCOSE      POCT Blood Glucose.: 110 mg/dL (14 Oct 2019 05:59)      Labs                          12.5   7.61  )-----------( 267      ( 14 Oct 2019 08:46 )             38.4       10-13    134<L>  |  101  |  5<L>  ----------------------------<  196<H>  3.7   |  25  |  0.74    Ca    8.4      13 Oct 2019 12:42    TPro  7.7  /  Alb  3.2<L>  /  TBili  0.6  /  DBili  x   /  AST  214<H>  /  ALT  504<H>  /  AlkPhos  152<H>  10-13                Radiology Results      Meds    MEDICATIONS  (STANDING):  dextrose 5%. 1000 milliLiter(s) (50 mL/Hr) IV Continuous <Continuous>  dextrose 50% Injectable 12.5 Gram(s) IV Push once  dextrose 50% Injectable 25 Gram(s) IV Push once  dextrose 50% Injectable 25 Gram(s) IV Push once  insulin lispro (HumaLOG) corrective regimen sliding scale   SubCutaneous every 6 hours  pantoprazole    Tablet 40 milliGRAM(s) Oral before breakfast  piperacillin/tazobactam IVPB.. 3.375 Gram(s) IV Intermittent every 8 hours  sodium chloride 0.9%. 1000 milliLiter(s) (50 mL/Hr) IV Continuous <Continuous>      MEDICATIONS  (PRN):  dextrose 40% Gel 15 Gram(s) Oral once PRN Blood Glucose LESS THAN 70 milliGRAM(s)/deciLiter  glucagon  Injectable 1 milliGRAM(s) IntraMuscular once PRN Glucose <70 milliGRAM(s)/deciLiter  ibuprofen  Tablet. 200 milliGRAM(s) Oral three times a day PRN Mild Pain (1 - 3)      Physical Exam    Neuro :  no focal deficits  Respiratory: CTA B/L  CV: RRR, S1S2, no murmurs,   Abdominal: Soft, NT, ND +BS,  Extremities: No edema, + peripheral pulses    ASSESSMENT    Inflammatory liver disease  DM (diabetes mellitus)  No significant past surgical history  No significant past surgical history      PLAN 39 year old F with PMH of DM came to the ED with fever and RUQ abdominal pain that started 6 days ago. Pt states that she went for a family visit to [place] the last week when she started spiking fever of 101-102 F everyday. 2 days later patient started feeling RUQ abdominal pain that is dull, progressively increasing, non radiating, aggravated by movement like laughing, walking and coughing; ibuprofen did not help relieve the pain. The pain is not related to food intake though the symptoms started after feast during the festival which did consist of fatty food. Pt went to ED in [place] and got US Abdomen done which showed no GB stones but some pericholecystic fluid and LFTs were elevate. Pt wanted to get further [place]up done in [place]. Pt had 1 episode of vomiting on Monday. Pt denies nausea, changes is color of stool or urine, diarrhea/constipation, nausea, changes in urinary habits, myalgia, joint pains or rash.    In ED, pt spiked fever of 102 F. Labs significant for elevated LFTS with US showing dilated CBD with no stone vsible. She was given 1 dose of zosyn. GI Dr Cash was consulted, recommended MRCP for possible cholangitis, as well as hepatitis workup.     Review of Systems:  · Negative Respiratory and Thorax Symptoms	no wheezing; no dyspnea; no cough	  · Negative Cardiovascular Symptoms	no chest pain; no palpitations; no dyspnea on exertion	  · Negative General Genitourinary Symptoms	no dysuria; normal urinary frequency	      pt seen in icu [  ], reg med floor [ x  ], bed [x  ], chair at bedside [   ], a+o x3 [ x ], lethargic [  ],  nad [ x ]        Allergies    No Known Allergies        Vitals    T(F): 97.4 (10-14-19 @ 05:00), Max: 102 (10-13-19 @ 12:09)  HR: 65 (10-14-19 @ 05:00) (65 - 104)  BP: 100/65 (10-14-19 @ 05:00) (100/60 - 115/74)  RR: 16 (10-14-19 @ 05:00) (16 - 20)  SpO2: 97% (10-14-19 @ 05:00) (97% - 100%)  Wt(kg): --  CAPILLARY BLOOD GLUCOSE      POCT Blood Glucose.: 110 mg/dL (14 Oct 2019 05:59)      Labs                          12.5   7.61  )-----------( 267      ( 14 Oct 2019 08:46 )             38.4       10-13    134<L>  |  101  |  5<L>  ----------------------------<  196<H>  3.7   |  25  |  0.74    Ca    8.4      13 Oct 2019 12:42    TPro  7.7  /  Alb  3.2<L>  /  TBili  0.6  /  DBili  x   /  AST  214<H>  /  ALT  504<H>  /  AlkPhos  152<H>  10-13          Radiology Results      Meds    MEDICATIONS  (STANDING):  dextrose 5%. 1000 milliLiter(s) (50 mL/Hr) IV Continuous <Continuous>  dextrose 50% Injectable 12.5 Gram(s) IV Push once  dextrose 50% Injectable 25 Gram(s) IV Push once  dextrose 50% Injectable 25 Gram(s) IV Push once  insulin lispro (HumaLOG) corrective regimen sliding scale   SubCutaneous every 6 hours  pantoprazole    Tablet 40 milliGRAM(s) Oral before breakfast  piperacillin/tazobactam IVPB.. 3.375 Gram(s) IV Intermittent every 8 hours  sodium chloride 0.9%. 1000 milliLiter(s) (50 mL/Hr) IV Continuous <Continuous>      MEDICATIONS  (PRN):  dextrose 40% Gel 15 Gram(s) Oral once PRN Blood Glucose LESS THAN 70 milliGRAM(s)/deciLiter  glucagon  Injectable 1 milliGRAM(s) IntraMuscular once PRN Glucose <70 milliGRAM(s)/deciLiter  ibuprofen  Tablet. 200 milliGRAM(s) Oral three times a day PRN Mild Pain (1 - 3)      Physical Exam    Neuro :  no focal deficits  Respiratory: CTA B/L  CV: RRR, S1S2, no murmurs,   Abdominal: Soft, ND +BS, ruq tender to moderate palp  Extremities: No edema, + peripheral pulses    ASSESSMENT    sepsis   r/o cholangitis  transaminits  h/o DM (diabetes mellitus)        PLAN      cont zosyn   id cons  f/u blood cx  f/u mrcp  gi cons   f/u ca 19-9,   f/u hepatitis panel  cont npo  cont ivf  hss  f/u [id number]  cont current meds

## 2019-10-14 NOTE — PROGRESS NOTE ADULT - PROBLEM SELECTOR PLAN 1
US abdomen shows No cholelithiasis. Dilated common bile duct.  afebrile, WBC WNL  C/w Zosyn , Day 1   F/u MRCP  F/u ID consult with  Dr. Whittington  F/u GI consult  with Dr. Cash US abdomen shows No cholelithiasis. Dilated common bile duct.  afebrile, WBC WNL  BC NGTD  C/w Zosyn , Day 1   F/u MRCP  F/u ID consult with  Dr. Whittington  F/u GI consult  with Dr. Cash

## 2019-10-14 NOTE — PROGRESS NOTE ADULT - SUBJECTIVE AND OBJECTIVE BOX
[  ] STAT REQUEST              [ X ] ROUTINE REQUEST    Patient is a 39 year old female with RUQ abdominal pain and elevated LFT's. GI consulted to evaluate.      HPI:  39 year old female with past medical history significant for DM presented with fever and dull, continuos, severity of 5-6/10 RUQ abdominal pain that started 6 days ago. she started spiking fever of 101-102 F everyday 2 days after patient started feeling RUQ abdominal pain that is dull, progressively increasing, non radiating, aggravated by movement like laughing, walking and coughing; ibuprofen did not help relieve the pain. Pt went to ED in [place] and got US Abdomen done which showed no GB stones but some pericholecystic fluid and LFTs were elevate. Pt wanted to get further workup done in [place]. Pt had 1 episode of vomiting on Monday. Pt denies hematemesis, hematochezia, melena, chest pain, SOB, cough, palpitation, dysuria, hematuria, or bacteria.         PAIN MANAGEMENT:  Pain Scale:                 5-6/10  Pain Location:  RUQ abdominal pain    Prior Colonoscopy:    PAST MEDICAL HISTORY  DM (diabetes mellitus)      PAST SURGICAL HISTORY  No significant past surgical history  No significant past surgical history      Allergies    No Known Allergies    Intolerances        HOME MEDICATIONS    MEDICATIONS  (STANDING):  insulin lispro (HumaLOG) corrective regimen sliding scale   SubCutaneous every 6 hours  pantoprazole    Tablet 40 milliGRAM(s) Oral before breakfast  piperacillin/tazobactam IVPB.. 3.375 Gram(s) IV Intermittent every 8 hours  sodium chloride 0.9%. 1000 milliLiter(s) (50 mL/Hr) IV Continuous <Continuous>    MEDICATIONS  (PRN):  ibuprofen  Tablet. 200 milliGRAM(s) Oral three times a day PRN Mild Pain (1 - 3)      SOCIAL HISTORY  Advanced Directives:       [  ] Full Code       [  ] DNR  Marital Status:         [  ] M      [  ] S      [  ] D       [  ] W  Children:       [  ] Yes      [  ] No  Occupation:        [  ] Employed       [  ] Unemployed       [  ] Retired  Diet:       [  ] Regular       [  ] PEG feeding          [  ] NG tube feeding  Drug Use:           [  ] Patient denied          [  ] Yes  Alcohol:           [  ] No             [  ] Yes (socially)         [  ] Yes (chronic)  Tobacco:           [  ] Yes           [  ] No    FAMILY HISTORY  [  ] Heart Disease            [  ] Diabetes             [  ] HTN             [  ] Colon Cancer             [  ] Stomach Cancer              [  ] Pancreatic Cancer    VITAL SIGNS  Temp:  BP:  P:  R:  Daily     Daily Weight in k.4 (14 Oct 2019 01:00)       CBC Full  -  ( 14 Oct 2019 08:46 )  WBC Count : 7.61 K/uL  RBC Count : 4.38 M/uL  Hemoglobin : 12.5 g/dL  Hematocrit : 38.4 %  Platelet Count - Automated : 267 K/uL  Mean Cell Volume : 87.7 fl  Mean Cell Hemoglobin : 28.5 pg  Mean Cell Hemoglobin Concentration : 32.6 gm/dL  Auto Neutrophil # : 3.73 K/uL  Auto Lymphocyte # : 1.90 K/uL  Auto Monocyte # : 0.53 K/uL  Auto Eosinophil # : 0.99 K/uL  Auto Basophil # : 0.15 K/uL  Auto Neutrophil % : 48.0 %  Auto Lymphocyte % : 25.0 %  Auto Monocyte % : 7.0 %  Auto Eosinophil % : 13.0 %  Auto Basophil % : 2.0 %      10-14    140  |  107  |  6<L>  ----------------------------<  102<H>  3.5   |  27  |  0.65    Ca    8.0<L>      14 Oct 2019 08:46    TPro  7.2  /  Alb  3.0<L>  /  TBili  0.6  /  DBili  x   /  AST  317<H>  /  ALT  519<H>  /  AlkPhos  134<H>  10-14      Lipase, Serum: 52 U/L (10-14 @ 08:46)  Amylase, Serum Total: 16 U/L (10-14 @ 08:46)      ALT/SGPT 519  Albumin, Serum 3.0  Alkaline Phosphatase 134  AST/SGOT 317  Bilirubin Direct --  Bilirubin Total 0.6  Indirect Bilirubin --  Hepatitis A Total --  Hepatitis B Core Antibody --  Hepatitis B Surface Antibody --  Hepatitis B Surface Antigen --  Hepatitis C Virus Interpretation --  Hepatitis C Virus Genotype --  ALT/SGPT 504  Albumin, Serum 3.2  Alkaline Phosphatase 152  AST/SGOT 214  Bilirubin Direct --  Bilirubin Total 0.6  Indirect Bilirubin --  Hepatitis A Total --  Hepatitis B Core Antibody --  Hepatitis B Surface Antibody --  Hepatitis B Surface Antigen --  Hepatitis C Virus Interpretation --  Hepatitis C Virus Genotype --          PT/INR - ( 13 Oct 2019 12:42 )   PT: 17.7 sec;   INR: 1.57 ratio         PTT - ( 13 Oct 2019 12:42 )  PTT:31.9 sec    RADIOLOGY/IMAGING [  ] STAT REQUEST              [ X ] ROUTINE REQUEST    Patient is a 39 year old female with RUQ abdominal pain and elevated LFT's. GI consulted to evaluate.      HPI:  39 year old female with past medical history significant for DM presented with fever and dull, continuos, severity of 5-6/10 RUQ abdominal pain that started 6 days ago associated with fever and vomiting.  she started spiking fever of 101-102 F everyday 2 days after patient started feeling RUQ abdominal pain. Patient reported ibuprofen did not help relieve the pain. Pt went to ED in [place] and got US Abdomen done which showed no GB stones but some pericholecystic fluid and LFTs were elevate. Pt wanted to get further workup done in [place]. Patient denies hematochezia, hematemesis, melena, chest pain, SOB, cough, palpitation, dysuria, hematuria, or diarrhea.         PAIN MANAGEMENT:  Pain Scale:                 5-6/10  Pain Location:  RUQ abdominal pain    Prior Colonoscopy:    PAST MEDICAL HISTORY  DM        PAST SURGICAL HISTORY  No significant past surgical history         Allergies    No Known Allergies          MEDICATIONS  (STANDING):  insulin lispro (HumaLOG) corrective regimen sliding scale   SubCutaneous every 6 hours  pantoprazole    Tablet 40 milliGRAM(s) Oral before breakfast  piperacillin/tazobactam IVPB.. 3.375 Gram(s) IV Intermittent every 8 hours  sodium chloride 0.9%. 1000 milliLiter(s) (50 mL/Hr) IV Continuous <Continuous>    MEDICATIONS  (PRN):  ibuprofen  Tablet. 200 milliGRAM(s) Oral three times a day PRN Mild Pain (1 - 3)      SOCIAL HISTORY  Advanced Directives:       [ X ] Full Code       [  ] DNR  Marital Status:         [  ] M      [ X ] S      [  ] D       [  ] W  Children:       [  ] Yes      [ X ] No  Occupation:        [ X ] Employed       [  ] Unemployed       [  ] Retired  Diet:       [X  ] Regular       [  ] PEG feeding          [  ] NG tube feeding  Drug Use:           [X  ] Patient denied          [  ] Yes  Alcohol:           [ X ] No             [  ] Yes (socially)         [  ] Yes (chronic)  Tobacco:           [  ] Yes           [ X ] No    FAMILY HISTORY  [ X] Heart Disease            [  ] Diabetes             [  ] HTN             [  ] Colon Cancer             [  ] Stomach Cancer              [  ] Pancreatic Cancer        VITAL SIGNS   Vital Signs Last 24 Hrs  T(C): 37.7 (14 Oct 2019 13:55), Max: 37.7 (13 Oct 2019 20:00)  T(F): 99.8 (14 Oct 2019 13:55), Max: 99.8 (13 Oct 2019 20:00)  HR: 77 (14 Oct 2019 13:55) (65 - 97)  BP: 117/68 (14 Oct 2019 13:55) (100/65 - 117/68)   RR: 16 (14 Oct 2019 13:55) (16 - 18)  SpO2: 98% (14 Oct 2019 13:55) (97% - 99%)     Daily Weight in k.4 (14 Oct 2019 01:00)           CBC Full  -  ( 14 Oct 2019 08:46 )  WBC Count : 7.61 K/uL  RBC Count : 4.38 M/uL  Hemoglobin : 12.5 g/dL  Hematocrit : 38.4 %  Platelet Count - Automated : 267 K/uL  Mean Cell Volume : 87.7 fl  Mean Cell Hemoglobin : 28.5 pg  Mean Cell Hemoglobin Concentration : 32.6 gm/dL  Auto Neutrophil # : 3.73 K/uL  Auto Lymphocyte # : 1.90 K/uL  Auto Monocyte # : 0.53 K/uL  Auto Eosinophil # : 0.99 K/uL  Auto Basophil # : 0.15 K/uL  Auto Neutrophil % : 48.0 %  Auto Lymphocyte % : 25.0 %  Auto Monocyte % : 7.0 %  Auto Eosinophil % : 13.0 %  Auto Basophil % : 2.0 %      10-14    140  |  107  |  6<L>  ----------------------------<  102<H>  3.5   |  27  |  0.65    Ca    8.0<L>      14 Oct 2019 08:46    TPro  7.2  /  Alb  3.0<L>  /  TBili  0.6  /  DBili  x   /  AST  317<H>  /  ALT  519<H>  /  AlkPhos  134<H>  10-14      Lipase, Serum: 52 U/L (10-14 @ 08:46)  Amylase, Serum Total: 16 U/L (10-14 @ 08:46)      PT/INR - ( 13 Oct 2019 12:42 )   PT: 17.7 sec;   INR: 1.57 ratio     PTT - ( 13 Oct 2019 12:42 )  PTT:31.9 sec      Urinalysis (10.13.19 @ 13:05)    pH Urine: 6.5    Glucose Qualitative, Urine: Negative    Blood, Urine: Negative    Color: Yellow    Urine Appearance: Clear    Bilirubin: Negative    Ketone - Urine: Negative    Specific Gravity: 1.010    Protein, Urine: Negative    Urobilinogen: Negative    Nitrite: Negative    Leukocyte Esterase Concentration: Negative      ECG  Ventricular Rate 99 BPM    Atrial Rate 99 BPM    P-R Interval 164 ms    QRS Duration 84 ms    Q-T Interval 326 ms    QTC Calculation(Bezet) 418 ms    P Axis 52 degrees    R Axis 48 degrees    T Axis 9 degrees    Diagnosis Line Normal sinus rhythm  Normal ECG    RADIOLOGY/IMAGING                EXAM:  CT ABDOMEN AND PELVIS                            PROCEDURE DATE:  2018          INTERPRETATION:  CLINICAL INFORMATION: Right flank pain. Rule out   nephrolithiasis.    TECHNIQUE: Helical CT of the abdomen and pelvis was performed without the   administration of oral or intravenous contrast. Evaluation of the   visceral organs is limited without intravenous contrast and evaluation of   the bowel is limited without oral contrast. Coronal and sagittal   reformats were additionally performed.     COMPARISON: No similar prior studies are available for comparison.    FINDINGS:  Lung bases: Subsegmental atelectasis is seen in the lung bases.    Organs: Minimal right hydroureter is seen with minimal right periureteral   inflammatory change suspicious for a ureteritis. There is no evidence of   nephrolithiasis or obstructive uropathy. The liver is diffusely low in   attenuation compatible with fatty infiltration. The liver is enlarged   measuring up to 22.4 cm (CC). Noncontrast evaluation of the gallbladder,   spleen, pancreas, kidneys and adrenal glands are unremarkable.    Gastrointestinal tract: There is no evidence of a bowel obstruction or   inflammation. The appendix is unremarkable.    Vascular: There is no abdominal aortic aneurysm.    Pelvis: The urinary bladder, uterus and ovaries are unremarkable.    Miscellaneous: There is no significant abdominal or pelvic   lymphadenopathy. No free air or free fluid is demonstrated.    Bones: The visualized osseous structures are unremarkable.    IMPRESSION:  Minimal right ureteritis which could be due to a passed stone versus an   ascending infection. Correlate with urinalysis.        EXAM:  US LIVER AND PANCREAS                            PROCEDURE DATE:  10/13/2019          INTERPRETATION:  CLINICAL INFORMATION: Right-sided abdominal pain    TECHNIQUE: Sonographic images of the abdomen was performed.     COMPARISON: 3/25/2018    FINDINGS:     LIVER: Diffuse hepatic steatosis limiting evaluation for focal lesions.   No intrahepatic biliary ductal dilatation.   GALLBLADDER: No gallstones, wall thickening, or pericholecystic fluid. No   sonographic López's sign.   CBD: Dilated, 8 mm.   PANCREAS: Evaluation of the pancreas is limited secondary to overlying   bowel gas.   RIGHT KIDNEY: 11.3 cm in length. No hydronephrosis, stones, or solid   masses.   AORTA AND IVC: Visualized portions of the IVC and aorta are unremarkable.     IMPRESSION:  No cholelithiasis. Dilated common bile duct. A definite   obstructing calculus is not visualized. MRI/MRCP suggested for further   evaluation. [  ] STAT REQUEST              [ X ] ROUTINE REQUEST    Patient is a 39 year old female with RUQ abdominal pain and elevated LFT's. GI consulted to evaluate.      HPI:  39 year old female with past medical history significant for DM presented with fever and dull, continuos, severity of 5-6/10 RUQ abdominal pain that started 6 days ago associated with fever and vomiting.  she started spiking fever of 101-102 F everyday 2 days after patient started feeling RUQ abdominal pain. Patient reported ibuprofen did not help relieve the pain. Pt went to ED in [place] and got US Abdomen done which showed no GB stones but some pericholecystic fluid and LFTs were elevate. Pt wanted to get further workup done in [place]. Patient denies hematochezia, hematemesis, melena, chest pain, SOB, cough, palpitation, dysuria, hematuria, or diarrhea.         PAIN MANAGEMENT:  Pain Scale:                 5-6/10  Pain Location:  RUQ abdominal pain    Prior Colonoscopy:  No prior colonoscopy    PAST MEDICAL HISTORY  DM        PAST SURGICAL HISTORY  No significant past surgical history         Allergies    No Known Allergies          MEDICATIONS  (STANDING):  insulin lispro (HumaLOG) corrective regimen sliding scale   SubCutaneous every 6 hours  pantoprazole    Tablet 40 milliGRAM(s) Oral before breakfast  piperacillin/tazobactam IVPB.. 3.375 Gram(s) IV Intermittent every 8 hours  sodium chloride 0.9%. 1000 milliLiter(s) (50 mL/Hr) IV Continuous <Continuous>    MEDICATIONS  (PRN):  ibuprofen  Tablet. 200 milliGRAM(s) Oral three times a day PRN Mild Pain (1 - 3)      SOCIAL HISTORY  Advanced Directives:       [ X ] Full Code       [  ] DNR  Marital Status:         [  ] M      [ X ] S      [  ] D       [  ] W  Children:       [  ] Yes      [ X ] No  Occupation:        [ X ] Employed       [  ] Unemployed       [  ] Retired  Diet:       [X  ] Regular       [  ] PEG feeding          [  ] NG tube feeding  Drug Use:           [X  ] Patient denied          [  ] Yes  Alcohol:           [ X ] No             [  ] Yes (socially)         [  ] Yes (chronic)  Tobacco:           [  ] Yes           [ X ] No    FAMILY HISTORY  [ X] Heart Disease            [  ] Diabetes             [  ] HTN             [  ] Colon Cancer             [  ] Stomach Cancer              [  ] Pancreatic Cancer        VITAL SIGNS   Vital Signs Last 24 Hrs  T(C): 37.7 (14 Oct 2019 13:55), Max: 37.7 (13 Oct 2019 20:00)  T(F): 99.8 (14 Oct 2019 13:55), Max: 99.8 (13 Oct 2019 20:00)  HR: 77 (14 Oct 2019 13:55) (65 - 97)  BP: 117/68 (14 Oct 2019 13:55) (100/65 - 117/68)   RR: 16 (14 Oct 2019 13:55) (16 - 18)  SpO2: 98% (14 Oct 2019 13:55) (97% - 99%)     Daily Weight in k.4 (14 Oct 2019 01:00)           CBC Full  -  ( 14 Oct 2019 08:46 )  WBC Count : 7.61 K/uL  RBC Count : 4.38 M/uL  Hemoglobin : 12.5 g/dL  Hematocrit : 38.4 %  Platelet Count - Automated : 267 K/uL  Mean Cell Volume : 87.7 fl  Mean Cell Hemoglobin : 28.5 pg  Mean Cell Hemoglobin Concentration : 32.6 gm/dL  Auto Neutrophil # : 3.73 K/uL  Auto Lymphocyte # : 1.90 K/uL  Auto Monocyte # : 0.53 K/uL  Auto Eosinophil # : 0.99 K/uL  Auto Basophil # : 0.15 K/uL  Auto Neutrophil % : 48.0 %  Auto Lymphocyte % : 25.0 %  Auto Monocyte % : 7.0 %  Auto Eosinophil % : 13.0 %  Auto Basophil % : 2.0 %      10-14    140  |  107  |  6<L>  ----------------------------<  102<H>  3.5   |  27  |  0.65    Ca    8.0<L>      14 Oct 2019 08:46    TPro  7.2  /  Alb  3.0<L>  /  TBili  0.6  /  DBili  x   /  AST  317<H>  /  ALT  519<H>  /  AlkPhos  134<H>  10-14      Lipase, Serum: 52 U/L (10-14 @ 08:46)  Amylase, Serum Total: 16 U/L (10-14 @ 08:46)      PT/INR - ( 13 Oct 2019 12:42 )   PT: 17.7 sec;   INR: 1.57 ratio     PTT - ( 13 Oct 2019 12:42 )  PTT:31.9 sec      Urinalysis (10.13.19 @ 13:05)    pH Urine: 6.5    Glucose Qualitative, Urine: Negative    Blood, Urine: Negative    Color: Yellow    Urine Appearance: Clear    Bilirubin: Negative    Ketone - Urine: Negative    Specific Gravity: 1.010    Protein, Urine: Negative    Urobilinogen: Negative    Nitrite: Negative    Leukocyte Esterase Concentration: Negative      ECG  Ventricular Rate 99 BPM    Atrial Rate 99 BPM    P-R Interval 164 ms    QRS Duration 84 ms    Q-T Interval 326 ms    QTC Calculation(Bezet) 418 ms    P Axis 52 degrees    R Axis 48 degrees    T Axis 9 degrees    Diagnosis Line Normal sinus rhythm  Normal ECG    RADIOLOGY/IMAGING                EXAM:  CT ABDOMEN AND PELVIS                            PROCEDURE DATE:  2018          INTERPRETATION:  CLINICAL INFORMATION: Right flank pain. Rule out   nephrolithiasis.    TECHNIQUE: Helical CT of the abdomen and pelvis was performed without the   administration of oral or intravenous contrast. Evaluation of the   visceral organs is limited without intravenous contrast and evaluation of   the bowel is limited without oral contrast. Coronal and sagittal   reformats were additionally performed.     COMPARISON: No similar prior studies are available for comparison.    FINDINGS:  Lung bases: Subsegmental atelectasis is seen in the lung bases.    Organs: Minimal right hydroureter is seen with minimal right periureteral   inflammatory change suspicious for a ureteritis. There is no evidence of   nephrolithiasis or obstructive uropathy. The liver is diffusely low in   attenuation compatible with fatty infiltration. The liver is enlarged   measuring up to 22.4 cm (CC). Noncontrast evaluation of the gallbladder,   spleen, pancreas, kidneys and adrenal glands are unremarkable.    Gastrointestinal tract: There is no evidence of a bowel obstruction or   inflammation. The appendix is unremarkable.    Vascular: There is no abdominal aortic aneurysm.    Pelvis: The urinary bladder, uterus and ovaries are unremarkable.    Miscellaneous: There is no significant abdominal or pelvic   lymphadenopathy. No free air or free fluid is demonstrated.    Bones: The visualized osseous structures are unremarkable.    IMPRESSION:  Minimal right ureteritis which could be due to a passed stone versus an   ascending infection. Correlate with urinalysis.        EXAM:  US LIVER AND PANCREAS                            PROCEDURE DATE:  10/13/2019          INTERPRETATION:  CLINICAL INFORMATION: Right-sided abdominal pain    TECHNIQUE: Sonographic images of the abdomen was performed.     COMPARISON: 3/25/2018    FINDINGS:     LIVER: Diffuse hepatic steatosis limiting evaluation for focal lesions.   No intrahepatic biliary ductal dilatation.   GALLBLADDER: No gallstones, wall thickening, or pericholecystic fluid. No   sonographic López's sign.   CBD: Dilated, 8 mm.   PANCREAS: Evaluation of the pancreas is limited secondary to overlying   bowel gas.   RIGHT KIDNEY: 11.3 cm in length. No hydronephrosis, stones, or solid   masses.   AORTA AND IVC: Visualized portions of the IVC and aorta are unremarkable.     IMPRESSION:  No cholelithiasis. Dilated common bile duct. A definite   obstructing calculus is not visualized. MRI/MRCP suggested for further   evaluation.

## 2019-10-14 NOTE — DISCHARGE NOTE PROVIDER - CARE PROVIDER_API CALL
Pravin Cash)  Medicine  [address] [place], [place] [id number]  Phone: [phone]  Fax: [phone]  Follow Up Time: 1 week Fausto Noble)  Gastroenterology; Internal Medicine  [address] [place] [address]  Phone: [phone]  Fax: [phone]  Follow Up Time:

## 2019-10-14 NOTE — PROGRESS NOTE ADULT - ASSESSMENT
1. RUQ abdominal pain  2. Elevated LFT's  3. Dilated CBD  4. R/o CBD stone  5. R/o viral hepatitis    Suggestions:    1. MRCP  2.Follow up LFT's  3. Check hepatitis serology for A, B and C  4. Avoid NSAID  5. GI and DVT prophylaxis

## 2019-10-14 NOTE — PROGRESS NOTE ADULT - PROBLEM SELECTOR PLAN 3
upward trending  F/u MRCP  F/u GI consult Dr. Cash  Monitor Hepatitis panel  Avoid hepatotoxic agents  Trend LFT's

## 2019-10-14 NOTE — DISCHARGE NOTE PROVIDER - NSDCFUADDAPPT_GEN_ALL_CORE_FT
Follow-up with your primary care physician and Gastroenterologist within 1 week. Call for appointment. Follow-up with your primary care physician and Gastroenterologist within 1 week. Call for appointment.    you have an apt with Dr. Kvng Noble on monday 10/21/19 at 1030AM at [facility]

## 2019-10-14 NOTE — PROGRESS NOTE ADULT - SUBJECTIVE AND OBJECTIVE BOX
NP Note discussed with  Primary Attending    Patient is a 39y old  Female who presents with a chief complaint of Fever and RUQ abdominal pain X 6 days (14 Oct 2019 14:18)    HPI  39 year old F with PMH of DM , and recent travel to [place], who presented to ED with fever and RUQ abdominal pain x 6 days, and pericholecystic fluid with elevated LFT's per workup in [place]. In ED, US abdomen shows dilated CBD with no stone , and labs significant for transaminitis. Pt was started on Zosyn for cholangitis vs acute hepatitis. Currently, awaiting MRCP. GI and ID consulted.       INTERVAL HPI/OVERNIGHT EVENTS: Pt seen and examined this morning. Pt reports abdominal pain is less.     MEDICATIONS  (STANDING):  insulin lispro (HumaLOG) corrective regimen sliding scale   SubCutaneous every 6 hours  pantoprazole    Tablet 40 milliGRAM(s) Oral before breakfast  piperacillin/tazobactam IVPB.. 3.375 Gram(s) IV Intermittent every 8 hours  sodium chloride 0.9%. 1000 milliLiter(s) (50 mL/Hr) IV Continuous <Continuous>    MEDICATIONS  (PRN):  ibuprofen  Tablet. 200 milliGRAM(s) Oral three times a day PRN Mild Pain (1 - 3)      __________________________________________________  REVIEW OF SYSTEMS:    CONSTITUTIONAL: No fever,   EYES: no acute visual disturbances  NECK: No pain or stiffness  RESPIRATORY: No cough; No shortness of breath  CARDIOVASCULAR: No chest pain, no palpitations  GASTROINTESTINAL: Less pain. No nausea or vomiting; No diarrhea   NEUROLOGICAL: No headache or numbness, no tremors  MUSCULOSKELETAL: No joint pain, no muscle pain  GENITOURINARY: no dysuria, no frequency, no hesitancy  PSYCHIATRY: no depression , no anxiety  ALL OTHER  ROS negative        Vital Signs Last 24 Hrs  T(C): 37.7 (14 Oct 2019 13:55), Max: 37.7 (13 Oct 2019 20:00)  T(F): 99.8 (14 Oct 2019 13:55), Max: 99.8 (13 Oct 2019 20:00)  HR: 77 (14 Oct 2019 13:55) (65 - 97)  BP: 117/68 (14 Oct 2019 13:55) (100/65 - 117/68)  BP(mean): --  RR: 16 (14 Oct 2019 13:55) (16 - 18)  SpO2: 98% (14 Oct 2019 13:55) (97% - 99%)    ________________________________________________  PHYSICAL EXAM:  GENERAL: NAD  HEENT: Normocephalic;  conjunctivae and sclerae clear; moist mucous membranes;   NECK : supple  CHEST/LUNG: Clear to auscultation bilaterally with good air entry   HEART: S1 S2  regular; no murmurs, gallops or rubs  ABDOMEN: Soft, Nontender, Nondistended; Bowel sounds present  EXTREMITIES: no cyanosis; no edema; no calf tenderness  SKIN: warm and dry; no rash  NERVOUS SYSTEM:  Awake and alert; Oriented  to place, person and time ; no new deficits    _________________________________________________  LABS:                        12.5   7.61  )-----------( 267      ( 14 Oct 2019 08:46 )             38.4     10-    140  |  107  |  6<L>  ----------------------------<  102<H>  3.5   |  27  |  0.65    Ca    8.0<L>      14 Oct 2019 08:46    TPro  7.2  /  Alb  3.0<L>  /  TBili  0.6  /  DBili  x   /  AST  317<H>  /  ALT  519<H>  /  AlkPhos  134<H>  10-14    PT/INR - ( 13 Oct 2019 12:42 )   PT: 17.7 sec;   INR: 1.57 ratio         PTT - ( 13 Oct 2019 12:42 )  PTT:31.9 sec  Urinalysis Basic - ( 13 Oct 2019 13:05 )    Color: Yellow / Appearance: Clear / S.010 / pH: x  Gluc: x / Ketone: Negative  / Bili: Negative / Urobili: Negative   Blood: x / Protein: Negative / Nitrite: Negative   Leuk Esterase: Negative / RBC: x / WBC x   Sq Epi: x / Non Sq Epi: x / Bacteria: x      CAPILLARY BLOOD GLUCOSE      POCT Blood Glucose.: 99 mg/dL (14 Oct 2019 11:44)  POCT Blood Glucose.: 110 mg/dL (14 Oct 2019 05:59)  POCT Blood Glucose.: 118 mg/dL (14 Oct 2019 00:16)        RADIOLOGY & ADDITIONAL TESTS:    Imaging Personally Reviewed:  YES/NO    Consultant(s) Notes Reviewed:   YES/ No    Care Discussed with Consultants :     Plan of care was discussed with patient and /or primary care giver; all questions and concerns were addressed and care was aligned with patient's wishes.

## 2019-10-14 NOTE — DISCHARGE NOTE PROVIDER - NSDCFUSCHEDAPPT_GEN_ALL_CORE_FT
DENA BARRAZA ; 10/21/2019 ; [facility] [address] DENA BARRAZA ; 10/21/2019 ; [facility] [address] DENA BARRAZA ; 10/21/2019 ; [facility] [address]

## 2019-10-14 NOTE — CONSULT NOTE ADULT - SUBJECTIVE AND OBJECTIVE BOX
Patient is a 39y old  Female who presents with a chief complaint of Fever and RUQ abdominal pain X 6 days (14 Oct 2019 15:34)      REVIEW OF SYSTEMS: Total of twelve systems have been reviewed with patient and found to be negative unless mentioned in HPI        PAST MEDICAL & SURGICAL HISTORY:  DM (diabetes mellitus)  No significant past surgical history        SOCIAL HISTORY  Alcohol: Does not drink  Tobacco: Does not smoke  Illicit substance use: None      FAMILY HISTORY: Non contributory to the present illness        ALLERGIES: No Known Allergies      Vital Signs Last 24 Hrs  T(C): 37.7 (14 Oct 2019 13:55), Max: 37.7 (13 Oct 2019 20:00)  T(F): 99.8 (14 Oct 2019 13:55), Max: 99.8 (13 Oct 2019 20:00)  HR: 77 (14 Oct 2019 13:55) (65 - 97)  BP: 117/68 (14 Oct 2019 13:55) (100/65 - 117/68)  BP(mean): --  RR: 16 (14 Oct 2019 13:55) (16 - 18)  SpO2: 98% (14 Oct 2019 13:55) (97% - 99%)      PHYSICAL EXAM:  GENERAL: Not in distress   CHEST/LUNG:  Aire ntry bilaterally  HEART: s1 and s2 present  ABDOMEN:  Nontender and  Nondistended  EXTREMITIES: No pedal  edema  CNS: Awake and Alert      LABS:                        12.5   7.61  )-----------( 267      ( 14 Oct 2019 08:46 )             38.4     10-    140  |  107  |  6<L>  ----------------------------<  102<H>  3.5   |  27  |  0.65    Ca    8.0<L>      14 Oct 2019 08:46    TPro  7.2  /  Alb  3.0<L>  /  TBili  0.6  /  DBili  x   /  AST  317<H>  /  ALT  519<H>  /  AlkPhos  134<H>  10-14    PT/INR - ( 13 Oct 2019 12:42 )   PT: 17.7 sec;   INR: 1.57 ratio      PTT - ( 13 Oct 2019 12:42 )  PTT:31.9 sec      CAPILLARY BLOOD GLUCOSE  POCT Blood Glucose.: 104 mg/dL (14 Oct 2019 18:13)  POCT Blood Glucose.: 99 mg/dL (14 Oct 2019 11:44)  POCT Blood Glucose.: 110 mg/dL (14 Oct 2019 05:59)  POCT Blood Glucose.: 118 mg/dL (14 Oct 2019 00:16)        Urinalysis Basic - ( 13 Oct 2019 13:05 )  Color: Yellow / Appearance: Clear / S.010 / pH: x  Gluc: x / Ketone: Negative  / Bili: Negative / Urobili: Negative   Blood: x / Protein: Negative / Nitrite: Negative   Leuk Esterase: Negative / RBC: x / WBC x   Sq Epi: x / Non Sq Epi: x / Bacteria: x        MEDICATIONS  (STANDING):  insulin lispro (HumaLOG) corrective regimen sliding scale   SubCutaneous every 6 hours  pantoprazole    Tablet 40 milliGRAM(s) Oral before breakfast  piperacillin/tazobactam IVPB.. 3.375 Gram(s) IV Intermittent every 8 hours  sodium chloride 0.9%. 1000 milliLiter(s) (50 mL/Hr) IV Continuous <Continuous>    MEDICATIONS  (PRN):  ibuprofen  Tablet. 200 milliGRAM(s) Oral three times a day PRN Mild Pain (1 - 3)          RADIOLOGY & ADDITIONAL TESTS:    < from: US Hepatic & Pancreatic (10.13.19 @ 16:38) >  IMPRESSION:  No cholelithiasis. Dilated common bile duct. A definite   obstructing calculus is not visualized. MRI/MRCP suggested for further   evaluation.    < end of copied text > Patient is a 39y old  Female  with PMH of DM presents to the ER for evaluation of fever and RUQ abdominal pain. Pt states that she went for a family visit to [place] the last week when she started spiking fever of 101-102 F everyday. 2 days later she developed  RUQ abdominal pain.  The pain is not related to food intake though the symptoms started after feast during the festival which did consist of fatty food. Pt went to ED in [place] and got US Abdomen done which showed no GB stones but some pericholecystic fluid and LFTs were elevate. In ER, she found to have fever, T 102 F and tachycardia. The Labs significant for elevated LFTS with US showing dilated CBD with no stone visible. She was given 1 dose of zosyn, and The ID consult requested to assist with further evaluation and antibiotic management.       REVIEW OF SYSTEMS: Total of twelve systems have been reviewed with patient and found to be negative unless mentioned in HPI        PAST MEDICAL & SURGICAL HISTORY:  DM (diabetes mellitus)  No significant past surgical history        SOCIAL HISTORY  Alcohol: Does not drink  Tobacco: Does not smoke  Illicit substance use: None      FAMILY HISTORY: Non contributory to the present illness        ALLERGIES: No Known Allergies      Vital Signs Last 24 Hrs  T(C): 37.7 (14 Oct 2019 13:55), Max: 37.7 (13 Oct 2019 20:00)  T(F): 99.8 (14 Oct 2019 13:55), Max: 99.8 (13 Oct 2019 20:00)  HR: 77 (14 Oct 2019 13:55) (65 - 97)  BP: 117/68 (14 Oct 2019 13:55) (100/65 - 117/68)  BP(mean): --  RR: 16 (14 Oct 2019 13:55) (16 - 18)  SpO2: 98% (14 Oct 2019 13:55) (97% - 99%)      PHYSICAL EXAM:  GENERAL: Not in distress   CHEST/LUNG:  Air entry bilaterally  HEART: s1 and s2 present  ABDOMEN: RUQ tenderness  EXTREMITIES: No pedal  edema  CNS: Awake and Alert        LABS:                        11.6   8.77  )-----------( 266      ( 15 Oct 2019 08:19 )             35.9                           12.5   7.61  )-----------( 267      ( 14 Oct 2019 08:46 )             38.4         10-15    137  |  105  |  8   ----------------------------<  133<H>  3.4<L>   |  24  |  0.61    Ca    8.2<L>      15 Oct 2019 08:19    TPro  6.8  /  Alb  2.8<L>  /  TBili  0.6  /  DBili  x   /  AST  331<H>  /  ALT  514<H>  /  AlkPhos  129<H>  10-15      10-14    140  |  107  |  6<L>  ----------------------------<  102<H>  3.5   |  27  |  0.65    Ca    8.0<L>      14 Oct 2019 08:46    TPro  7.2  /  Alb  3.0<L>  /  TBili  0.6  /  DBili  x   /  AST  317<H>  /  ALT  519<H>  /  AlkPhos  134<H>  10-14    PT/INR - ( 13 Oct 2019 12:42 )   PT: 17.7 sec;   INR: 1.57 ratio      PTT - ( 13 Oct 2019 12:42 )  PTT:31.9 sec      CAPILLARY BLOOD GLUCOSE  POCT Blood Glucose.: 104 mg/dL (14 Oct 2019 18:13)  POCT Blood Glucose.: 99 mg/dL (14 Oct 2019 11:44)  POCT Blood Glucose.: 110 mg/dL (14 Oct 2019 05:59)  POCT Blood Glucose.: 118 mg/dL (14 Oct 2019 00:16)        Urinalysis Basic - ( 13 Oct 2019 13:05 )  Color: Yellow / Appearance: Clear / S.010 / pH: x  Gluc: x / Ketone: Negative  / Bili: Negative / Urobili: Negative   Blood: x / Protein: Negative / Nitrite: Negative   Leuk Esterase: Negative / RBC: x / WBC x   Sq Epi: x / Non Sq Epi: x / Bacteria: x        MEDICATIONS  (STANDING):  dextrose 5%. 1000 milliLiter(s) (50 mL/Hr) IV Continuous <Continuous>  dextrose 50% Injectable 12.5 Gram(s) IV Push once  dextrose 50% Injectable 25 Gram(s) IV Push once  dextrose 50% Injectable 25 Gram(s) IV Push once  insulin lispro (HumaLOG) corrective regimen sliding scale   SubCutaneous three times a day before meals  insulin lispro (HumaLOG) corrective regimen sliding scale   SubCutaneous at bedtime  melatonin 3 milliGRAM(s) Oral at bedtime  pantoprazole    Tablet 40 milliGRAM(s) Oral before breakfast  piperacillin/tazobactam IVPB.. 3.375 Gram(s) IV Intermittent every 8 hours  potassium chloride    Tablet ER 20 milliEquivalent(s) Oral once  sodium chloride 0.9%. 1000 milliLiter(s) (50 mL/Hr) IV Continuous <Continuous>    MEDICATIONS  (PRN):  dextrose 40% Gel 15 Gram(s) Oral once PRN Blood Glucose LESS THAN 70 milliGRAM(s)/deciliter  glucagon  Injectable 1 milliGRAM(s) IntraMuscular once PRN Glucose LESS THAN 70 milligrams/deciliter  ibuprofen  Tablet. 200 milliGRAM(s) Oral three times a day PRN Mild Pain (1 - 3)          RADIOLOGY & ADDITIONAL TESTS:    10/13/19 : US Hepatic & Pancreatic (10.13.19 @ 16:38)   No cholelithiasis. Dilated common bile duct. A definite obstructing calculus is not visualized. MRI/MRCP suggested for further   evaluation.        MICROBIOLOGY DATA:    Culture - Urine (10.13.19 @ 21:58)    Specimen Source: .Urine    Culture Results:   <10,000 CFU/mL Normal Urogenital Daysi Patient is a 39y old  Female  with PMH of DM presents to the ER for evaluation of fever and RUQ abdominal pain. Pt states that she went for a family visit to [place] the last week when she started spiking fever of 101-102 F everyday. 2 days later she developed  RUQ abdominal pain.  The pain is not related to food intake though the symptoms started after feast during the festival which did consist of fatty food. Pt went to ED in [place] and got US Abdomen done which showed no GB stones but some pericholecystic fluid and LFTs were elevate. In ER, she found to have fever, T 102 F and tachycardia. The Labs significant for elevated LFTS with US showing dilated CBD with no stone visible. She was given 1 dose of zosyn, and The ID consult requested to assist with further evaluation and antibiotic management.       REVIEW OF SYSTEMS: Total of twelve systems have been reviewed with patient and found to be negative unless mentioned in HPI        PAST MEDICAL & SURGICAL HISTORY:  DM (diabetes mellitus)  No significant past surgical history        SOCIAL HISTORY  Alcohol: Does not drink  Tobacco: Does not smoke  Illicit substance use: None      FAMILY HISTORY: Non contributory to the present illness        ALLERGIES: No Known Allergies      Vital Signs Last 24 Hrs  T(C): 37.7 (14 Oct 2019 13:55), Max: 37.7 (13 Oct 2019 20:00)  T(F): 99.8 (14 Oct 2019 13:55), Max: 99.8 (13 Oct 2019 20:00)  HR: 77 (14 Oct 2019 13:55) (65 - 97)  BP: 117/68 (14 Oct 2019 13:55) (100/65 - 117/68)  BP(mean): --  RR: 16 (14 Oct 2019 13:55) (16 - 18)  SpO2: 98% (14 Oct 2019 13:55) (97% - 99%)      PHYSICAL EXAM:  GENERAL: Not in distress   CHEST/LUNG:  Air entry bilaterally  HEART: s1 and s2 present  ABDOMEN: RUQ tenderness  EXTREMITIES: No pedal  edema  CNS: Awake and Alert        LABS:                       12.5   7.61  )-----------( 267      ( 14 Oct 2019 08:46 )             38.4           10-14    140  |  107  |  6<L>  ----------------------------<  102<H>  3.5   |  27  |  0.65    Ca    8.0<L>      14 Oct 2019 08:46    TPro  7.2  /  Alb  3.0<L>  /  TBili  0.6  /  DBili  x   /  AST  317<H>  /  ALT  519<H>  /  AlkPhos  134<H>  10-14    PT/INR - ( 13 Oct 2019 12:42 )   PT: 17.7 sec;   INR: 1.57 ratio      PTT - ( 13 Oct 2019 12:42 )  PTT:31.9 sec      CAPILLARY BLOOD GLUCOSE  POCT Blood Glucose.: 104 mg/dL (14 Oct 2019 18:13)  POCT Blood Glucose.: 99 mg/dL (14 Oct 2019 11:44)  POCT Blood Glucose.: 110 mg/dL (14 Oct 2019 05:59)  POCT Blood Glucose.: 118 mg/dL (14 Oct 2019 00:16)        Urinalysis Basic - ( 13 Oct 2019 13:05 )  Color: Yellow / Appearance: Clear / S.010 / pH: x  Gluc: x / Ketone: Negative  / Bili: Negative / Urobili: Negative   Blood: x / Protein: Negative / Nitrite: Negative   Leuk Esterase: Negative / RBC: x / WBC x   Sq Epi: x / Non Sq Epi: x / Bacteria: x        MEDICATIONS  (STANDING):  dextrose 5%. 1000 milliLiter(s) (50 mL/Hr) IV Continuous <Continuous>  dextrose 50% Injectable 12.5 Gram(s) IV Push once  dextrose 50% Injectable 25 Gram(s) IV Push once  dextrose 50% Injectable 25 Gram(s) IV Push once  insulin lispro (HumaLOG) corrective regimen sliding scale   SubCutaneous three times a day before meals  insulin lispro (HumaLOG) corrective regimen sliding scale   SubCutaneous at bedtime  melatonin 3 milliGRAM(s) Oral at bedtime  pantoprazole    Tablet 40 milliGRAM(s) Oral before breakfast  piperacillin/tazobactam IVPB.. 3.375 Gram(s) IV Intermittent every 8 hours  potassium chloride    Tablet ER 20 milliEquivalent(s) Oral once  sodium chloride 0.9%. 1000 milliLiter(s) (50 mL/Hr) IV Continuous <Continuous>    MEDICATIONS  (PRN):  dextrose 40% Gel 15 Gram(s) Oral once PRN Blood Glucose LESS THAN 70 milliGRAM(s)/deciliter  glucagon  Injectable 1 milliGRAM(s) IntraMuscular once PRN Glucose LESS THAN 70 milligrams/deciliter  ibuprofen  Tablet. 200 milliGRAM(s) Oral three times a day PRN Mild Pain (1 - 3)          RADIOLOGY & ADDITIONAL TESTS:    10/13/19 : US Hepatic & Pancreatic (10.13.19 @ 16:38)   No cholelithiasis. Dilated common bile duct. A definite obstructing calculus is not visualized. MRI/MRCP suggested for further   evaluation.        MICROBIOLOGY DATA:    Culture - Urine (10.13.19 @ 21:58)    Specimen Source: .Urine    Culture Results:   <10,000 CFU/mL Normal Urogenital Daysi

## 2019-10-14 NOTE — CONSULT NOTE ADULT - ASSESSMENT
# Sepsis ( fever + tachycardia )  # Cholangitis      would recommend:    1. Follow up Cultures  2. Follow up MRCP of abd/pelvis  3. Continue zosyn until workup is done      will follow the patient with you and make further recommendation based on the clinical course and Lab results  Thank you for the opportunity to participate in Ms. Hernandez's care Patient is a 39y old  Female  with PMH of DM presents to the ER for evaluation of fever and RUQ abdominal pain. Pt states that she went for a family visit to [place] the last week when she started spiking fever of 101-102 F everyday. 2 days later she developed  RUQ abdominal pain.  The pain is not related to food intake though the symptoms started after feast during the festival which did consist of fatty food. Pt went to ED in [place] and got US Abdomen done which showed no GB stones but some pericholecystic fluid and LFTs were elevate. In ER, she found to have fever, T 102 F and tachycardia. The Labs significant for elevated LFTS with US showing dilated CBD with no stone visible. She was given 1 dose of zosyn, and The ID consult requested to assist with further evaluation and antibiotic management.     # Sepsis ( fever + tachycardia )  # Cholangitis    would recommend:    1. Follow up Cultures  2. Follow up MRCP of abd/pelvis  3. Continue zosyn until workup is done    d/w patient and her  at the bed side    will follow the patient with you and make further recommendation based on the clinical course and Lab results  Thank you for the opportunity to participate in Ms. Hernandez's care

## 2019-10-15 LAB
ALBUMIN SERPL ELPH-MCNC: 2.8 G/DL — LOW (ref 3.5–5)
ALP SERPL-CCNC: 129 U/L — HIGH (ref 40–120)
ALT FLD-CCNC: 514 U/L DA — HIGH (ref 10–60)
ANION GAP SERPL CALC-SCNC: 8 MMOL/L — SIGNIFICANT CHANGE UP (ref 5–17)
AST SERPL-CCNC: 331 U/L — HIGH (ref 10–40)
BILIRUB SERPL-MCNC: 0.6 MG/DL — SIGNIFICANT CHANGE UP (ref 0.2–1.2)
BUN SERPL-MCNC: 8 MG/DL — SIGNIFICANT CHANGE UP (ref 7–18)
CALCIUM SERPL-MCNC: 8.2 MG/DL — LOW (ref 8.4–10.5)
CHLORIDE SERPL-SCNC: 105 MMOL/L — SIGNIFICANT CHANGE UP (ref 96–108)
CO2 SERPL-SCNC: 24 MMOL/L — SIGNIFICANT CHANGE UP (ref 22–31)
CREAT SERPL-MCNC: 0.61 MG/DL — SIGNIFICANT CHANGE UP (ref 0.5–1.3)
GLUCOSE BLDC GLUCOMTR-MCNC: 128 MG/DL — HIGH (ref 70–99)
GLUCOSE BLDC GLUCOMTR-MCNC: 131 MG/DL — HIGH (ref 70–99)
GLUCOSE BLDC GLUCOMTR-MCNC: 133 MG/DL — HIGH (ref 70–99)
GLUCOSE BLDC GLUCOMTR-MCNC: 139 MG/DL — HIGH (ref 70–99)
GLUCOSE SERPL-MCNC: 133 MG/DL — HIGH (ref 70–99)
HCT VFR BLD CALC: 35.9 % — SIGNIFICANT CHANGE UP (ref 34.5–45)
HGB BLD-MCNC: 11.6 G/DL — SIGNIFICANT CHANGE UP (ref 11.5–15.5)
MCHC RBC-ENTMCNC: 28 PG — SIGNIFICANT CHANGE UP (ref 27–34)
MCHC RBC-ENTMCNC: 32.3 GM/DL — SIGNIFICANT CHANGE UP (ref 32–36)
MCV RBC AUTO: 86.5 FL — SIGNIFICANT CHANGE UP (ref 80–100)
NRBC # BLD: 0 /100 WBCS — SIGNIFICANT CHANGE UP (ref 0–0)
PLATELET # BLD AUTO: 266 K/UL — SIGNIFICANT CHANGE UP (ref 150–400)
POTASSIUM SERPL-MCNC: 3.4 MMOL/L — LOW (ref 3.5–5.3)
POTASSIUM SERPL-SCNC: 3.4 MMOL/L — LOW (ref 3.5–5.3)
PROT SERPL-MCNC: 6.8 G/DL — SIGNIFICANT CHANGE UP (ref 6–8.3)
RBC # BLD: 4.15 M/UL — SIGNIFICANT CHANGE UP (ref 3.8–5.2)
RBC # FLD: 12 % — SIGNIFICANT CHANGE UP (ref 10.3–14.5)
SODIUM SERPL-SCNC: 137 MMOL/L — SIGNIFICANT CHANGE UP (ref 135–145)
WBC # BLD: 8.77 K/UL — SIGNIFICANT CHANGE UP (ref 3.8–10.5)
WBC # FLD AUTO: 8.77 K/UL — SIGNIFICANT CHANGE UP (ref 3.8–10.5)

## 2019-10-15 PROCEDURE — 99231 SBSQ HOSP IP/OBS SF/LOW 25: CPT

## 2019-10-15 PROCEDURE — 74177 CT ABD & PELVIS W/CONTRAST: CPT | Mod: 26

## 2019-10-15 RX ORDER — POTASSIUM CHLORIDE 20 MEQ
20 PACKET (EA) ORAL ONCE
Refills: 0 | Status: COMPLETED | OUTPATIENT
Start: 2019-10-15 | End: 2019-10-16

## 2019-10-15 RX ADMIN — Medication 200 MILLIGRAM(S): at 15:51

## 2019-10-15 RX ADMIN — Medication 200 MILLIGRAM(S): at 15:21

## 2019-10-15 RX ADMIN — Medication 200 MILLIGRAM(S): at 05:50

## 2019-10-15 RX ADMIN — PIPERACILLIN AND TAZOBACTAM 25 GRAM(S): 4; .5 INJECTION, POWDER, LYOPHILIZED, FOR SOLUTION INTRAVENOUS at 08:58

## 2019-10-15 RX ADMIN — Medication 200 MILLIGRAM(S): at 06:20

## 2019-10-15 RX ADMIN — PANTOPRAZOLE SODIUM 40 MILLIGRAM(S): 20 TABLET, DELAYED RELEASE ORAL at 05:50

## 2019-10-15 RX ADMIN — PIPERACILLIN AND TAZOBACTAM 25 GRAM(S): 4; .5 INJECTION, POWDER, LYOPHILIZED, FOR SOLUTION INTRAVENOUS at 18:07

## 2019-10-15 NOTE — PROGRESS NOTE ADULT - ASSESSMENT
Patient is a 39y old  Female  with PMH of DM presents to the ER for evaluation of fever and RUQ abdominal pain. Pt states that she went for a family visit to [place] the last week when she started spiking fever of 101-102 F everyday. 2 days later she developed  RUQ abdominal pain.  The pain is not related to food intake though the symptoms started after feast during the festival which did consist of fatty food. Pt went to ED in [place] and got US Abdomen done which showed no GB stones but some pericholecystic fluid and LFTs were elevate. In ER, she found to have fever, T 102 F and tachycardia. The Labs significant for elevated LFTS with US showing dilated CBD with no stone visible. She was given 1 dose of zosyn, and The ID consult requested to assist with further evaluation and antibiotic management.     # Sepsis ( fever + tachycardia )  # Cholangitis    would recommend:    1. Follow up Final Cultures  2. Continue zosyn until workup is done    d/w patient and her  at the bed side    will follow the patient with you Patient is a 39y old  Female  with PMH of DM presents to the ER for evaluation of fever and RUQ abdominal pain. Pt states that she went for a family visit to [place] the last week when she started spiking fever of 101-102 F everyday. 2 days later she developed  RUQ abdominal pain.  The pain is not related to food intake though the symptoms started after feast during the festival which did consist of fatty food. Pt went to ED in [place] and got US Abdomen done which showed no GB stones but some pericholecystic fluid and LFTs were elevate. In ER, she found to have fever, T 102 F and tachycardia. The Labs significant for elevated LFTS with US showing dilated CBD with no stone visible. She was given 1 dose of zosyn, and The ID consult requested to assist with further evaluation and antibiotic management.     # Sepsis ( fever + tachycardia )  # Cholangitis -   # RML consolidation  # acute pancreatitis or nonspecific duodenitis. C    would recommend:    1. Follow up Final Cultures  2. Continue zosyn inpatient and May change to oral Levaquin and Flagyl on discharge to complete the 10 days course    d/w patient and her  at the bed side    will follow the patient with you

## 2019-10-15 NOTE — PROGRESS NOTE ADULT - SUBJECTIVE AND OBJECTIVE BOX
[   ] ICU                                          [   ] CCU                                      [  X ] Medical Floor      Patient is comfortable. No new complaints reported, No abdominal pain, N/V, hematemesis, hematochezia, melena, fever, chills, chest pain, SOB, cough or diarrhea reported.    VITALS  Vital Signs Last 24 Hrs  T(C): 37.3 (15 Oct 2019 20:47), Max: 37.4 (15 Oct 2019 05:25)  T(F): 99.1 (15 Oct 2019 20:47), Max: 99.4 (15 Oct 2019 05:25)  HR: 72 (15 Oct 2019 20:47) (72 - 88)  BP: 103/68 (15 Oct 2019 20:47) (101/69 - 112/79)   RR: 17 (15 Oct 2019 20:47) (16 - 17)  SpO2: 99% (15 Oct 2019 20:47) (97% - 99%)         MEDICATIONS  (STANDING):  dextrose 5%. 1000 milliLiter(s) (50 mL/Hr) IV Continuous <Continuous>  dextrose 50% Injectable 12.5 Gram(s) IV Push once  dextrose 50% Injectable 25 Gram(s) IV Push once  dextrose 50% Injectable 25 Gram(s) IV Push once  insulin lispro (HumaLOG) corrective regimen sliding scale   SubCutaneous three times a day before meals  insulin lispro (HumaLOG) corrective regimen sliding scale   SubCutaneous at bedtime  melatonin 3 milliGRAM(s) Oral at bedtime  pantoprazole    Tablet 40 milliGRAM(s) Oral before breakfast  piperacillin/tazobactam IVPB.. 3.375 Gram(s) IV Intermittent every 8 hours  potassium chloride    Tablet ER 20 milliEquivalent(s) Oral once  sodium chloride 0.9%. 1000 milliLiter(s) (50 mL/Hr) IV Continuous <Continuous>    MEDICATIONS  (PRN):  dextrose 40% Gel 15 Gram(s) Oral once PRN Blood Glucose LESS THAN 70 milliGRAM(s)/deciliter  glucagon  Injectable 1 milliGRAM(s) IntraMuscular once PRN Glucose LESS THAN 70 milligrams/deciliter  ibuprofen  Tablet. 200 milliGRAM(s) Oral three times a day PRN Mild Pain (1 - 3)                            11.6   8.77  )-----------( 266      ( 15 Oct 2019 08:19 )             35.9       10-15    137  |  105  |  8   ----------------------------<  133<H>  3.4<L>   |  24  |  0.61    Ca    8.2<L>      15 Oct 2019 08:19    TPro  6.8  /  Alb  2.8<L>  /  TBili  0.6  /  DBili  x   /  AST  331<H>  /  ALT  514<H>  /  AlkPhos  129<H>  10-15

## 2019-10-15 NOTE — PROGRESS NOTE ADULT - SUBJECTIVE AND OBJECTIVE BOX
Patient is a 39y old  Female who presents with a chief complaint of Fever and RUQ abdominal pain X 6 days (14 Oct 2019 18:13)     pt seen in icu [  ], reg med floor [ x  ], bed [  x], chair at bedside [   ], a+o x3 [x  ], lethargic [  ],  nad [ x ]    felder [  ], ngt [  ], peg [  ], et tube [  ], cent line [  ], picc line [  ]        Allergies    No Known Allergies        Vitals    T(F): 99.4 (10-15-19 @ 05:25), Max: 99.8 (10-14-19 @ 13:55)  HR: 88 (10-15-19 @ 05:25) (77 - 88)  BP: 101/69 (10-15-19 @ 05:25) (101/69 - 117/68)  RR: 16 (10-15-19 @ 05:25) (16 - 17)  SpO2: 97% (10-15-19 @ 05:25) (97% - 98%)  Wt(kg): --  CAPILLARY BLOOD GLUCOSE      POCT Blood Glucose.: 133 mg/dL (15 Oct 2019 08:10)      Labs                          11.6   8.77  )-----------( 266      ( 15 Oct 2019 08:19 )             35.9       10-15    137  |  105  |  8   ----------------------------<  133<H>  3.4<L>   |  24  |  0.61    Ca    8.2<L>      15 Oct 2019 08:19    TPro  6.8  /  Alb  2.8<L>  /  TBili  0.6  /  DBili  x   /  AST  331<H>  /  ALT  514<H>  /  AlkPhos  129<H>  10-15            .Urine  10-13 @ 21:58   <10,000 CFU/mL Normal Urogenital Daysi  --  --      .Blood  10-13 @ 14:30   No growth to date.  --  --          Radiology Results    < from: MR MRCP w/wo IV Cont (10.14.19 @ 17:21) >  IMPRESSION:     Small volume upper abdominal ascites, of uncertain etiology. Consider   further evaluation with contrast enhanced CT abdomen and pelvis.    Dilated common bile duct, measuring 0.9 cm. No cholelithiasis or   choledocholithiasis.    < end of copied text >        Meds    MEDICATIONS  (STANDING):  dextrose 5%. 1000 milliLiter(s) (50 mL/Hr) IV Continuous <Continuous>  dextrose 50% Injectable 12.5 Gram(s) IV Push once  dextrose 50% Injectable 25 Gram(s) IV Push once  dextrose 50% Injectable 25 Gram(s) IV Push once  insulin lispro (HumaLOG) corrective regimen sliding scale   SubCutaneous three times a day before meals  insulin lispro (HumaLOG) corrective regimen sliding scale   SubCutaneous at bedtime  melatonin 3 milliGRAM(s) Oral at bedtime  pantoprazole    Tablet 40 milliGRAM(s) Oral before breakfast  piperacillin/tazobactam IVPB.. 3.375 Gram(s) IV Intermittent every 8 hours  sodium chloride 0.9%. 1000 milliLiter(s) (50 mL/Hr) IV Continuous <Continuous>      MEDICATIONS  (PRN):  dextrose 40% Gel 15 Gram(s) Oral once PRN Blood Glucose LESS THAN 70 milliGRAM(s)/deciliter  glucagon  Injectable 1 milliGRAM(s) IntraMuscular once PRN Glucose LESS THAN 70 milligrams/deciliter  ibuprofen  Tablet. 200 milliGRAM(s) Oral three times a day PRN Mild Pain (1 - 3)      Physical Exam    Neuro :  no focal deficits  Respiratory: CTA B/L  CV: RRR, S1S2, no murmurs,   Abdominal: Soft, ND +BS, ruq tender to moderate palp  Extremities: No edema, + peripheral pulses      ASSESSMENT    sepsis   r/o cholangitis  transaminitis  h/o DM (diabetes mellitus)        PLAN      cont zosyn   id f/u   blood cx : no growth   mrcp noted : Small volume upper abdominal ascites, of uncertain etiology. Consider further evaluation with contrast enhanced CT abdomen and pelvis. Dilated common bile duct, measuring 0.9 cm. No cholelithiasis or  choledocholithiasis.  f/u ct abd pelvis with contrast  gi f/u   f/u ca 19-9,   hepatitis panel : negative   cont npo  cont ivf  hss  hgba1c : 7.4  supplement potassium for hypokalemia   cont current meds

## 2019-10-15 NOTE — PROGRESS NOTE ADULT - SUBJECTIVE AND OBJECTIVE BOX
NP Note discussed with  Primary Attending    Patient is a 39y old  Female who presents with a chief complaint of Fever and RUQ abdominal pain X 6 days (15 Oct 2019 10:14)    HPI      39 year old F with PMH of DM , and recent travel to [place], who presented to ED with fever and RUQ abdominal pain x 6 days, and pericholecystic fluid with elevated LFT's per workup in [place]. In ED, US abdomen shows dilated CBD with no stone , and labs significant for transaminitis. Pt was started on Zosyn for cholangitis vs acute hepatitis.     INTERVAL HPI/OVERNIGHT EVENTS: no new complaints    MEDICATIONS  (STANDING):  dextrose 5%. 1000 milliLiter(s) (50 mL/Hr) IV Continuous <Continuous>  dextrose 50% Injectable 12.5 Gram(s) IV Push once  dextrose 50% Injectable 25 Gram(s) IV Push once  dextrose 50% Injectable 25 Gram(s) IV Push once  insulin lispro (HumaLOG) corrective regimen sliding scale   SubCutaneous three times a day before meals  insulin lispro (HumaLOG) corrective regimen sliding scale   SubCutaneous at bedtime  melatonin 3 milliGRAM(s) Oral at bedtime  pantoprazole    Tablet 40 milliGRAM(s) Oral before breakfast  piperacillin/tazobactam IVPB.. 3.375 Gram(s) IV Intermittent every 8 hours  potassium chloride    Tablet ER 20 milliEquivalent(s) Oral once  sodium chloride 0.9%. 1000 milliLiter(s) (50 mL/Hr) IV Continuous <Continuous>    MEDICATIONS  (PRN):  dextrose 40% Gel 15 Gram(s) Oral once PRN Blood Glucose LESS THAN 70 milliGRAM(s)/deciliter  glucagon  Injectable 1 milliGRAM(s) IntraMuscular once PRN Glucose LESS THAN 70 milligrams/deciliter  ibuprofen  Tablet. 200 milliGRAM(s) Oral three times a day PRN Mild Pain (1 - 3)      __________________________________________________  REVIEW OF SYSTEMS:    CONSTITUTIONAL: No fever,   EYES: no acute visual disturbances  NECK: No pain or stiffness  RESPIRATORY: No cough; No shortness of breath  CARDIOVASCULAR: No chest pain, no palpitations  GASTROINTESTINAL: No pain. No nausea or vomiting; No diarrhea   NEUROLOGICAL: No headache or numbness, no tremors  MUSCULOSKELETAL: No joint pain, no muscle pain  GENITOURINARY: no dysuria, no frequency, no hesitancy  PSYCHIATRY: no depression , no anxiety  ALL OTHER  ROS negative        Vital Signs Last 24 Hrs  T(C): 37.4 (15 Oct 2019 05:25), Max: 37.7 (14 Oct 2019 13:55)  T(F): 99.4 (15 Oct 2019 05:25), Max: 99.8 (14 Oct 2019 13:55)  HR: 88 (15 Oct 2019 05:25) (77 - 88)  BP: 101/69 (15 Oct 2019 05:25) (101/69 - 117/68)  BP(mean): --  RR: 16 (15 Oct 2019 05:25) (16 - 17)  SpO2: 97% (15 Oct 2019 05:25) (97% - 98%)    ________________________________________________  PHYSICAL EXAM:  GENERAL: NAD  HEENT: Normocephalic;  conjunctivae and sclerae clear; moist mucous membranes;   NECK : supple  CHEST/LUNG: Clear to auscultation bilaterally with good air entry   HEART: S1 S2  regular; no murmurs, gallops or rubs  ABDOMEN: Soft, Nontender, Nondistended; Bowel sounds present  EXTREMITIES: no cyanosis; no edema; no calf tenderness  SKIN: warm and dry; no rash  NERVOUS SYSTEM:  Awake and alert; Oriented  to place, person and time ; no new deficits    _________________________________________________  LABS:                        11.6   8.77  )-----------( 266      ( 15 Oct 2019 08:19 )             35.9     10-15    137  |  105  |  8   ----------------------------<  133<H>  3.4<L>   |  24  |  0.61    Ca    8.2<L>      15 Oct 2019 08:19    TPro  6.8  /  Alb  2.8<L>  /  TBili  0.6  /  DBili  x   /  AST  331<H>  /  ALT  514<H>  /  AlkPhos  129<H>  10-15    PT/INR - ( 13 Oct 2019 12:42 )   PT: 17.7 sec;   INR: 1.57 ratio         PTT - ( 13 Oct 2019 12:42 )  PTT:31.9 sec  Urinalysis Basic - ( 13 Oct 2019 13:05 )    Color: Yellow / Appearance: Clear / S.010 / pH: x  Gluc: x / Ketone: Negative  / Bili: Negative / Urobili: Negative   Blood: x / Protein: Negative / Nitrite: Negative   Leuk Esterase: Negative / RBC: x / WBC x   Sq Epi: x / Non Sq Epi: x / Bacteria: x      CAPILLARY BLOOD GLUCOSE      POCT Blood Glucose.: 133 mg/dL (15 Oct 2019 08:10)  POCT Blood Glucose.: 199 mg/dL (14 Oct 2019 21:23)  POCT Blood Glucose.: 104 mg/dL (14 Oct 2019 18:13)  POCT Blood Glucose.: 99 mg/dL (14 Oct 2019 11:44)        RADIOLOGY & ADDITIONAL TESTS:    Imaging Personally Reviewed:  YES/NO    Consultant(s) Notes Reviewed:   YES/ No    Care Discussed with Consultants :     Plan of care was discussed with patient and /or primary care giver; all questions and concerns were addressed and care was aligned with patient's wishes. NP Note discussed with  Primary Attending    Patient is a 39y old  Female who presents with a chief complaint of Fever and RUQ abdominal pain X 6 days (15 Oct 2019 10:14)    HPI      39 year old F with PMH of DM , and recent travel to [place], who presented to ED with fever and RUQ abdominal pain x 6 days, and pericholecystic fluid with elevated LFT's per workup in [place]. In ED, US abdomen shows dilated CBD with no stone , and labs significant for transaminitis. Pt was started on Zosyn for cholangitis vs acute hepatitis. Acute Hepatitis panel non-reactive. MRCP shows Small volume upper abdominal ascites, of uncertain etiology.Dilated common bile duct, measuring 0.9 cm. No cholelithiasis or choledocholithiasis.      INTERVAL HPI/OVERNIGHT EVENTS: Pt seen and examined this morning. Pt reports feeling better, less abdominal discomfort, tolerating clear liquid diet.     MEDICATIONS  (STANDING):  dextrose 5%. 1000 milliLiter(s) (50 mL/Hr) IV Continuous <Continuous>  dextrose 50% Injectable 12.5 Gram(s) IV Push once  dextrose 50% Injectable 25 Gram(s) IV Push once  dextrose 50% Injectable 25 Gram(s) IV Push once  insulin lispro (HumaLOG) corrective regimen sliding scale   SubCutaneous three times a day before meals  insulin lispro (HumaLOG) corrective regimen sliding scale   SubCutaneous at bedtime  melatonin 3 milliGRAM(s) Oral at bedtime  pantoprazole    Tablet 40 milliGRAM(s) Oral before breakfast  piperacillin/tazobactam IVPB.. 3.375 Gram(s) IV Intermittent every 8 hours  potassium chloride    Tablet ER 20 milliEquivalent(s) Oral once  sodium chloride 0.9%. 1000 milliLiter(s) (50 mL/Hr) IV Continuous <Continuous>    MEDICATIONS  (PRN):  dextrose 40% Gel 15 Gram(s) Oral once PRN Blood Glucose LESS THAN 70 milliGRAM(s)/deciliter  glucagon  Injectable 1 milliGRAM(s) IntraMuscular once PRN Glucose LESS THAN 70 milligrams/deciliter  ibuprofen  Tablet. 200 milliGRAM(s) Oral three times a day PRN Mild Pain (1 - 3)      __________________________________________________  REVIEW OF SYSTEMS:    CONSTITUTIONAL: No fever,   EYES: no acute visual disturbances  NECK: No pain or stiffness  RESPIRATORY: No cough; No shortness of breath  CARDIOVASCULAR: No chest pain, no palpitations  GASTROINTESTINAL: Less  pain. No nausea or vomiting; No diarrhea   NEUROLOGICAL: No headache or numbness, no tremors  MUSCULOSKELETAL: No joint pain, no muscle pain  GENITOURINARY: no dysuria, no frequency, no hesitancy  PSYCHIATRY: no depression , no anxiety  ALL OTHER  ROS negative        Vital Signs Last 24 Hrs  T(C): 37.4 (15 Oct 2019 05:25), Max: 37.7 (14 Oct 2019 13:55)  T(F): 99.4 (15 Oct 2019 05:25), Max: 99.8 (14 Oct 2019 13:55)  HR: 88 (15 Oct 2019 05:25) (77 - 88)  BP: 101/69 (15 Oct 2019 05:25) (101/69 - 117/68)  BP(mean): --  RR: 16 (15 Oct 2019 05:25) (16 - 17)  SpO2: 97% (15 Oct 2019 05:25) (97% - 98%)    ________________________________________________  PHYSICAL EXAM:  GENERAL: NAD  HEENT: Normocephalic;  conjunctivae and sclerae clear; moist mucous membranes;   NECK : supple  CHEST/LUNG: Clear to auscultation bilaterally with good air entry   HEART: S1 S2  regular; no murmurs, gallops or rubs  ABDOMEN: Soft, Nontender, Nondistended; Bowel sounds present  EXTREMITIES: no cyanosis; no edema; no calf tenderness  SKIN: warm and dry; no rash  NERVOUS SYSTEM:  Awake and alert; Oriented  to place, person and time ; no new deficits    _________________________________________________  LABS:                        11.6   8.77  )-----------( 266      ( 15 Oct 2019 08:19 )             35.9     10-15    137  |  105  |  8   ----------------------------<  133<H>  3.4<L>   |  24  |  0.61    Ca    8.2<L>      15 Oct 2019 08:19    TPro  6.8  /  Alb  2.8<L>  /  TBili  0.6  /  DBili  x   /  AST  331<H>  /  ALT  514<H>  /  AlkPhos  129<H>  10-15    PT/INR - ( 13 Oct 2019 12:42 )   PT: 17.7 sec;   INR: 1.57 ratio         PTT - ( 13 Oct 2019 12:42 )  PTT:31.9 sec  Urinalysis Basic - ( 13 Oct 2019 13:05 )    Color: Yellow / Appearance: Clear / S.010 / pH: x  Gluc: x / Ketone: Negative  / Bili: Negative / Urobili: Negative   Blood: x / Protein: Negative / Nitrite: Negative   Leuk Esterase: Negative / RBC: x / WBC x   Sq Epi: x / Non Sq Epi: x / Bacteria: x      CAPILLARY BLOOD GLUCOSE      POCT Blood Glucose.: 133 mg/dL (15 Oct 2019 08:10)  POCT Blood Glucose.: 199 mg/dL (14 Oct 2019 21:23)  POCT Blood Glucose.: 104 mg/dL (14 Oct 2019 18:13)  POCT Blood Glucose.: 99 mg/dL (14 Oct 2019 11:44)        RADIOLOGY & ADDITIONAL TESTS:    Imaging Personally Reviewed:  YES/NO    Consultant(s) Notes Reviewed:   YES/ No    Care Discussed with Consultants :     Plan of care was discussed with patient and /or primary care giver; all questions and concerns were addressed and care was aligned with patient's wishes.

## 2019-10-15 NOTE — PROGRESS NOTE ADULT - PROBLEM SELECTOR PLAN 3
upward trending  F/u MRCP  F/u GI consult Dr. Cash  Monitor Hepatitis panel  Avoid hepatotoxic agents  Trend LFT's AST still increasing, ALT remains high, stable   Acute Hepatitis panel not detected.  GI Dr. Cash following.   Avoid hepatotoxic agents  Trend LFT's

## 2019-10-15 NOTE — PROGRESS NOTE ADULT - SUBJECTIVE AND OBJECTIVE BOX
Patient is seen and examined at the bed side, is afebrile.      REVIEW OF SYSTEMS: All other review systems are negative      ALLERGIES: No Known Allergies      Vital Signs Last 24 Hrs  T(C): 37.1 (15 Oct 2019 14:23), Max: 37.4 (15 Oct 2019 05:25)  T(F): 98.7 (15 Oct 2019 14:23), Max: 99.4 (15 Oct 2019 05:25)  HR: 78 (15 Oct 2019 14:23) (78 - 88)  BP: 112/79 (15 Oct 2019 14:23) (101/69 - 112/79)  BP(mean): --  RR: 16 (15 Oct 2019 14:23) (16 - 17)  SpO2: 98% (15 Oct 2019 14:23) (97% - 98%)        PHYSICAL EXAM:  GENERAL: Not in distress   CHEST/LUNG:  Air entry bilaterally  HEART: s1 and s2 present  ABDOMEN: RUQ tenderness  EXTREMITIES: No pedal  edema  CNS: Awake and Alert        LABS:                        11.6   8.77  )-----------( 266      ( 15 Oct 2019 08:19 )             35.9                         12.5   7.61  )-----------( 267      ( 14 Oct 2019 08:46 )             38.4         1015    137  |  105  |  8   ----------------------------<  133<H>  3.4<L>   |  24  |  0.61    Ca    8.2<L>      15 Oct 2019 08:19    TPro  6.8  /  Alb  2.8<L>  /  TBili  0.6  /  DBili  x   /  AST  331<H>  /  ALT  514<H>  /  AlkPhos  129<H>  10-15      1014    140  |  107  |  6<L>  ----------------------------<  102<H>  3.5   |  27  |  0.65    Ca    8.0<L>      14 Oct 2019 08:46    TPro  7.2  /  Alb  3.0<L>  /  TBili  0.6  /  DBili  x   /  AST  317<H>  /  ALT  519<H>  /  AlkPhos  134<H>  10-14    PT/INR - ( 13 Oct 2019 12:42 )   PT: 17.7 sec;   INR: 1.57 ratio      PTT - ( 13 Oct 2019 12:42 )  PTT:31.9 sec      CAPILLARY BLOOD GLUCOSE  POCT Blood Glucose.: 104 mg/dL (14 Oct 2019 18:13)  POCT Blood Glucose.: 99 mg/dL (14 Oct 2019 11:44)  POCT Blood Glucose.: 110 mg/dL (14 Oct 2019 05:59)  POCT Blood Glucose.: 118 mg/dL (14 Oct 2019 00:16)        Urinalysis Basic - ( 13 Oct 2019 13:05 )  Color: Yellow / Appearance: Clear / S.010 / pH: x  Gluc: x / Ketone: Negative  / Bili: Negative / Urobili: Negative   Blood: x / Protein: Negative / Nitrite: Negative   Leuk Esterase: Negative / RBC: x / WBC x   Sq Epi: x / Non Sq Epi: x / Bacteria: x        MEDICATIONS  (STANDING):  dextrose 5%. 1000 milliLiter(s) (50 mL/Hr) IV Continuous <Continuous>  dextrose 50% Injectable 12.5 Gram(s) IV Push once  dextrose 50% Injectable 25 Gram(s) IV Push once  dextrose 50% Injectable 25 Gram(s) IV Push once  insulin lispro (HumaLOG) corrective regimen sliding scale   SubCutaneous three times a day before meals  insulin lispro (HumaLOG) corrective regimen sliding scale   SubCutaneous at bedtime  melatonin 3 milliGRAM(s) Oral at bedtime  pantoprazole    Tablet 40 milliGRAM(s) Oral before breakfast  piperacillin/tazobactam IVPB.. 3.375 Gram(s) IV Intermittent every 8 hours  potassium chloride    Tablet ER 20 milliEquivalent(s) Oral once  sodium chloride 0.9%. 1000 milliLiter(s) (50 mL/Hr) IV Continuous <Continuous>    MEDICATIONS  (PRN):  dextrose 40% Gel 15 Gram(s) Oral once PRN Blood Glucose LESS THAN 70 milliGRAM(s)/deciliter  glucagon  Injectable 1 milliGRAM(s) IntraMuscular once PRN Glucose LESS THAN 70 milligrams/deciliter  ibuprofen  Tablet. 200 milliGRAM(s) Oral three times a day PRN Mild Pain (1 - 3)          RADIOLOGY & ADDITIONAL TESTS:    10/15/19 : CT Abdomen and Pelvis w/ IV Cont (10.15.19 @ 11:12)  Small pulmonary consolidation in the medial right middle lobe with air bronchogram may represent pneumonia. Follow-up chest CT in 4-6 weeks is recommended to ensure resolution.  Hepatic steatosis and hepatomegaly again noted.    Mild undulating contour of the liver may represent cirrhosis. Clinical  correlation is recommended. Splenomegaly and mild ascites.    Mild gallbladder wall thickening versus pericholecystic fluid. No CT   evidence for gallstone. If there is a clinical suspicion for acute   cholecystitis, HIDA scan may be pursued for further evaluation.     Mild common bile duct dilatation.    Mild stranding adjacent to the pancreatic head and duodenum may represent   acute pancreatitis or nonspecific duodenitis. Clinical correlation with   pancreatic enzymes is recommended. No evidence for pancreatic necrosis.    Mildly enlarged 1.4 cm peripancreatic lymph node.    < end of copied text >        < from: MR MRCP w/wo IV Cont (10.14.19 @ 17:21) >  Small volume upper abdominal ascites, of uncertain etiology. Consider   further evaluation with contrast enhanced CT abdomen and pelvis.    Dilated common bile duct, measuring 0.9 cm. No cholelithiasis or   choledocholithiasis.            10/13/19 : US Hepatic & Pancreatic (10.13.19 @ 16:38)   No cholelithiasis. Dilated common bile duct. A definite obstructing calculus is not visualized. MRI/MRCP suggested for further   evaluation.        MICROBIOLOGY DATA:    Culture - Blood (10.13.19 @ 14:30)    Specimen Source: .Blood    Culture Results:   No growth to date.    Culture - Blood (10.13.19 @ 14:30)    Specimen Source: .Blood    Culture Results:   No growth to date.      Culture - Urine (10.13.19 @ 21:58)    Specimen Source: .Urine    Culture Results:   <10,000 CFU/mL Normal Urogenital Daysi Patient is seen and examined at the bed side, is afebrile. She is feeling much better and tolerating regular diet well.         REVIEW OF SYSTEMS: All other review systems are negative        ALLERGIES: No Known Allergies      Vital Signs Last 24 Hrs  T(C): 37.1 (15 Oct 2019 14:23), Max: 37.4 (15 Oct 2019 05:25)  T(F): 98.7 (15 Oct 2019 14:23), Max: 99.4 (15 Oct 2019 05:25)  HR: 78 (15 Oct 2019 14:23) (78 - 88)  BP: 112/79 (15 Oct 2019 14:23) (101/69 - 112/79)  BP(mean): --  RR: 16 (15 Oct 2019 14:23) (16 - 17)  SpO2: 98% (15 Oct 2019 14:23) (97% - 98%)        PHYSICAL EXAM:  GENERAL: Not in distress   CHEST/LUNG:  Air entry bilaterally  HEART: s1 and s2 present  ABDOMEN: RUQ tenderness  EXTREMITIES: No pedal  edema  CNS: Awake and Alert        LABS:                        11.6   8.77  )-----------( 266      ( 15 Oct 2019 08:19 )             35.9                         12.5   7.61  )-----------( 267      ( 14 Oct 2019 08:46 )             38.4         10-15    137  |  105  |  8   ----------------------------<  133<H>  3.4<L>   |  24  |  0.61    Ca    8.2<L>      15 Oct 2019 08:19    TPro  6.8  /  Alb  2.8<L>  /  TBili  0.6  /  DBili  x   /  AST  331<H>  /  ALT  514<H>  /  AlkPhos  129<H>  10-15      10-14    140  |  107  |  6<L>  ----------------------------<  102<H>  3.5   |  27  |  0.65    Ca    8.0<L>      14 Oct 2019 08:46    TPro  7.2  /  Alb  3.0<L>  /  TBili  0.6  /  DBili  x   /  AST  317<H>  /  ALT  519<H>  /  AlkPhos  134<H>  10-14    PT/INR - ( 13 Oct 2019 12:42 )   PT: 17.7 sec;   INR: 1.57 ratio      PTT - ( 13 Oct 2019 12:42 )  PTT:31.9 sec      CAPILLARY BLOOD GLUCOSE  POCT Blood Glucose.: 104 mg/dL (14 Oct 2019 18:13)  POCT Blood Glucose.: 99 mg/dL (14 Oct 2019 11:44)  POCT Blood Glucose.: 110 mg/dL (14 Oct 2019 05:59)  POCT Blood Glucose.: 118 mg/dL (14 Oct 2019 00:16)        Urinalysis Basic - ( 13 Oct 2019 13:05 )  Color: Yellow / Appearance: Clear / S.010 / pH: x  Gluc: x / Ketone: Negative  / Bili: Negative / Urobili: Negative   Blood: x / Protein: Negative / Nitrite: Negative   Leuk Esterase: Negative / RBC: x / WBC x   Sq Epi: x / Non Sq Epi: x / Bacteria: x        MEDICATIONS  (STANDING):  dextrose 5%. 1000 milliLiter(s) (50 mL/Hr) IV Continuous <Continuous>  dextrose 50% Injectable 12.5 Gram(s) IV Push once  dextrose 50% Injectable 25 Gram(s) IV Push once  dextrose 50% Injectable 25 Gram(s) IV Push once  insulin lispro (HumaLOG) corrective regimen sliding scale   SubCutaneous three times a day before meals  insulin lispro (HumaLOG) corrective regimen sliding scale   SubCutaneous at bedtime  melatonin 3 milliGRAM(s) Oral at bedtime  pantoprazole    Tablet 40 milliGRAM(s) Oral before breakfast  piperacillin/tazobactam IVPB.. 3.375 Gram(s) IV Intermittent every 8 hours  potassium chloride    Tablet ER 20 milliEquivalent(s) Oral once  sodium chloride 0.9%. 1000 milliLiter(s) (50 mL/Hr) IV Continuous <Continuous>    MEDICATIONS  (PRN):  dextrose 40% Gel 15 Gram(s) Oral once PRN Blood Glucose LESS THAN 70 milliGRAM(s)/deciliter  glucagon  Injectable 1 milliGRAM(s) IntraMuscular once PRN Glucose LESS THAN 70 milligrams/deciliter  ibuprofen  Tablet. 200 milliGRAM(s) Oral three times a day PRN Mild Pain (1 - 3)          RADIOLOGY & ADDITIONAL TESTS:    10/15/19 : CT Abdomen and Pelvis w/ IV Cont (10.15.19 @ 11:12)  Small pulmonary consolidation in the medial right middle lobe with air bronchogram may represent pneumonia. Follow-up chest CT in 4-6 weeks is recommended to ensure resolution.  Hepatic steatosis and hepatomegaly again noted.  Mild undulating contour of the liver may represent cirrhosis. Clinical  correlation is recommended. Splenomegaly and mild ascites.    Mild gallbladder wall thickening versus pericholecystic fluid. No CT evidence for gallstone. If there is a clinical suspicion for acute  cholecystitis, HIDA scan may be pursued for further evaluation.  Mild common bile duct dilatation.    Mild stranding adjacent to the pancreatic head and duodenum may represent  acute pancreatitis or nonspecific duodenitis. Clinical correlation with  pancreatic enzymes is recommended. No evidence for pancreatic necrosis. Mildly enlarged 1.4 cm peripancreatic lymph node.      10/14/19 : MR MRCP w/wo IV Cont (10.14.19 @ 17:21) Small volume upper abdominal ascites, of uncertain etiology. Consider further evaluation with contrast enhanced CT abdomen and pelvis. Dilated common bile duct, measuring 0.9 cm. No cholelithiasis or choledocholithiasis.      10/13/19 : US Hepatic & Pancreatic (10.13.19 @ 16:38)   No cholelithiasis. Dilated common bile duct. A definite obstructing calculus is not visualized. MRI/MRCP suggested for further   evaluation.        MICROBIOLOGY DATA:    Culture - Blood (10.13.19 @ 14:30)    Specimen Source: .Blood    Culture Results:   No growth to date.    Culture - Blood (10.13.19 @ 14:30)    Specimen Source: .Blood    Culture Results:   No growth to date.      Culture - Urine (10.13.19 @ 21:58)    Specimen Source: .Urine    Culture Results:   <10,000 CFU/mL Normal Urogenital Daysi

## 2019-10-15 NOTE — PROGRESS NOTE ADULT - PROBLEM SELECTOR PLAN 2
Less pain today  C/w IVF while NPO  f/u MRCP  F/u GI consult with Dr Cash Less pain today. Tolerating clear liquid diet.  Will consider advancing  diet today   GI  Dr Shailesh Johnson following  F/u CT abd with  IV con

## 2019-10-15 NOTE — PROGRESS NOTE ADULT - PROBLEM SELECTOR PLAN 4
F/u Acute hepatitis panel results  F/u GI consult with Dr. Cash Acute hepatitis panel not detected  GI Dr. Cash following

## 2019-10-15 NOTE — PROGRESS NOTE ADULT - PROBLEM SELECTOR PLAN 1
US abdomen shows No cholelithiasis. Dilated common bile duct.  afebrile, WBC WNL  BC NGTD  C/w Zosyn , Day 1   F/u MRCP  F/u ID consult with  Dr. Whittington  F/u GI consult  with Dr. Cash US abdomen shows No cholelithiasis. Dilated common bile duct.  MRCP shows Small volume upper abdominal ascites, of uncertain etiology.Dilated common bile duct, measuring 0.9 cm. No cholelithiasis or choledocholithiasis.  afebrile, WBC WNL  BC NGTD  C/w Zosyn , Day 2  ID   Dr. Whittington following   GI  Dr. Cash following US abdomen shows No cholelithiasis. Dilated common bile duct.  MRCP shows Small volume upper abdominal ascites, of uncertain etiology.Dilated common bile duct, measuring 0.9 cm. No cholelithiasis or choledocholithiasis.  afebrile, WBC WNL  BC NGTD  C/w Zosyn , Day 2  F/u CT abd with  IV con  ID   Dr. Whittington following   GI  Dr. Cash following

## 2019-10-16 DIAGNOSIS — J90 PLEURAL EFFUSION, NOT ELSEWHERE CLASSIFIED: ICD-10-CM

## 2019-10-16 DIAGNOSIS — J18.9 PNEUMONIA, UNSPECIFIED ORGANISM: ICD-10-CM

## 2019-10-16 DIAGNOSIS — J98.11 ATELECTASIS: ICD-10-CM

## 2019-10-16 DIAGNOSIS — A41.9 SEPSIS, UNSPECIFIED ORGANISM: ICD-10-CM

## 2019-10-16 LAB
ALBUMIN SERPL ELPH-MCNC: 3 G/DL — LOW (ref 3.5–5)
ALP SERPL-CCNC: 142 U/L — HIGH (ref 40–120)
ALT FLD-CCNC: 585 U/L DA — HIGH (ref 10–60)
ANION GAP SERPL CALC-SCNC: 6 MMOL/L — SIGNIFICANT CHANGE UP (ref 5–17)
AST SERPL-CCNC: 360 U/L — HIGH (ref 10–40)
BILIRUB SERPL-MCNC: 0.7 MG/DL — SIGNIFICANT CHANGE UP (ref 0.2–1.2)
BUN SERPL-MCNC: 6 MG/DL — LOW (ref 7–18)
CALCIUM SERPL-MCNC: 8.3 MG/DL — LOW (ref 8.4–10.5)
CANCER AG19-9 SERPL-ACNC: 2 U/ML — SIGNIFICANT CHANGE UP
CHLORIDE SERPL-SCNC: 103 MMOL/L — SIGNIFICANT CHANGE UP (ref 96–108)
CO2 SERPL-SCNC: 25 MMOL/L — SIGNIFICANT CHANGE UP (ref 22–31)
CREAT SERPL-MCNC: 0.74 MG/DL — SIGNIFICANT CHANGE UP (ref 0.5–1.3)
GLUCOSE BLDC GLUCOMTR-MCNC: 121 MG/DL — HIGH (ref 70–99)
GLUCOSE BLDC GLUCOMTR-MCNC: 165 MG/DL — HIGH (ref 70–99)
GLUCOSE BLDC GLUCOMTR-MCNC: 167 MG/DL — HIGH (ref 70–99)
GLUCOSE BLDC GLUCOMTR-MCNC: 172 MG/DL — HIGH (ref 70–99)
GLUCOSE SERPL-MCNC: 131 MG/DL — HIGH (ref 70–99)
HETEROPH AB TITR SER AGGL: NEGATIVE — SIGNIFICANT CHANGE UP
LIDOCAIN IGE QN: 130 U/L — SIGNIFICANT CHANGE UP (ref 73–393)
POTASSIUM SERPL-MCNC: 3.6 MMOL/L — SIGNIFICANT CHANGE UP (ref 3.5–5.3)
POTASSIUM SERPL-SCNC: 3.6 MMOL/L — SIGNIFICANT CHANGE UP (ref 3.5–5.3)
PROT SERPL-MCNC: 7.5 G/DL — SIGNIFICANT CHANGE UP (ref 6–8.3)
SODIUM SERPL-SCNC: 134 MMOL/L — LOW (ref 135–145)

## 2019-10-16 PROCEDURE — 71250 CT THORAX DX C-: CPT | Mod: 26

## 2019-10-16 PROCEDURE — 99231 SBSQ HOSP IP/OBS SF/LOW 25: CPT

## 2019-10-16 RX ORDER — IBUPROFEN 200 MG
400 TABLET ORAL ONCE
Refills: 0 | Status: COMPLETED | OUTPATIENT
Start: 2019-10-16 | End: 2019-10-16

## 2019-10-16 RX ADMIN — Medication 20 MILLIEQUIVALENT(S): at 05:39

## 2019-10-16 RX ADMIN — Medication 200 MILLIGRAM(S): at 17:26

## 2019-10-16 RX ADMIN — Medication 400 MILLIGRAM(S): at 17:27

## 2019-10-16 RX ADMIN — PIPERACILLIN AND TAZOBACTAM 25 GRAM(S): 4; .5 INJECTION, POWDER, LYOPHILIZED, FOR SOLUTION INTRAVENOUS at 01:02

## 2019-10-16 RX ADMIN — Medication 3 MILLIGRAM(S): at 23:46

## 2019-10-16 RX ADMIN — Medication 400 MILLIGRAM(S): at 18:20

## 2019-10-16 RX ADMIN — PIPERACILLIN AND TAZOBACTAM 25 GRAM(S): 4; .5 INJECTION, POWDER, LYOPHILIZED, FOR SOLUTION INTRAVENOUS at 23:46

## 2019-10-16 RX ADMIN — PIPERACILLIN AND TAZOBACTAM 25 GRAM(S): 4; .5 INJECTION, POWDER, LYOPHILIZED, FOR SOLUTION INTRAVENOUS at 09:57

## 2019-10-16 RX ADMIN — Medication 1: at 12:28

## 2019-10-16 RX ADMIN — Medication 3 MILLIGRAM(S): at 01:02

## 2019-10-16 RX ADMIN — PIPERACILLIN AND TAZOBACTAM 25 GRAM(S): 4; .5 INJECTION, POWDER, LYOPHILIZED, FOR SOLUTION INTRAVENOUS at 17:54

## 2019-10-16 RX ADMIN — PANTOPRAZOLE SODIUM 40 MILLIGRAM(S): 20 TABLET, DELAYED RELEASE ORAL at 05:38

## 2019-10-16 RX ADMIN — Medication 1: at 17:25

## 2019-10-16 NOTE — PROGRESS NOTE ADULT - PROBLEM SELECTOR PLAN 1
-p/w RLQ pain and elevated LFT's   -US abdomen shows No cholelithiasis. Dilated common bile duct.  -MRCP shows Small volume upper abdominal ascites, of uncertain etiology. Dilated common bile duct, measuring 0.9 cm. No cholelithiasis or choledocholithiasis.  -CTA with no evidence of acute cholecystitis   -remains afebrile, WBC WNL  -cont Zosyn day #3  -f/u cultures- NTD   -f/u HIDA scan, npo after midnight for scan tomorrow   -ID Dr. Whittington   -GI  Dr. Cash

## 2019-10-16 NOTE — PROGRESS NOTE ADULT - SUBJECTIVE AND OBJECTIVE BOX
Patient is seen and examined at the bed side, is afebrile. She is feeling much better and tolerating regular diet well.         REVIEW OF SYSTEMS: All other review systems are negative        ALLERGIES: No Known Allergies      Vital Signs Last 24 Hrs  T(C): 37.4 (16 Oct 2019 13:45), Max: 37.4 (16 Oct 2019 13:45)  T(F): 99.3 (16 Oct 2019 13:45), Max: 99.3 (16 Oct 2019 13:45)  HR: 82 (16 Oct 2019 13:45) (72 - 82)  BP: 123/77 (16 Oct 2019 13:45) (99/65 - 123/77)  BP(mean): --  RR: 16 (16 Oct 2019 13:45) (16 - 17)  SpO2: 100% (16 Oct 2019 13:45) (96% - 100%)      PHYSICAL EXAM:  GENERAL: Not in distress   CHEST/LUNG:  Air entry bilaterally  HEART: s1 and s2 present  ABDOMEN: RUQ tenderness  EXTREMITIES: No pedal  edema  CNS: Awake and Alert        LABS: No new CBC                        11.6   8.77  )-----------( 266      ( 15 Oct 2019 08:19 )             35.9                  12.5   7.61  )-----------( 267      ( 14 Oct 2019 08:46 )             38.4       10-16    134<L>  |  103  |  6<L>  ----------------------------<  131<H>  3.6   |  25  |  0.74    Ca    8.3<L>      16 Oct 2019 07:16    TPro  7.5  /  Alb  3.0<L>  /  TBili  0.7  /  DBili  x   /  AST  360<H>  /  ALT  585<H>  /  AlkPhos  142<H>  10-16    10-15    137  |  105  |  8   ----------------------------<  133<H>  3.4<L>   |  24  |  0.61    Ca    8.2<L>      15 Oct 2019 08:19    TPro  6.8  /  Alb  2.8<L>  /  TBili  0.6  /  DBili  x   /  AST  331<H>  /  ALT  514<H>  /  AlkPhos  129<H>  10-15      10-14    140  |  107  |  6<L>  ----------------------------<  102<H>  3.5   |  27  |  0.65    Ca    8.0<L>      14 Oct 2019 08:46    TPro  7.2  /  Alb  3.0<L>  /  TBili  0.6  /  DBili  x   /  AST  317<H>  /  ALT  519<H>  /  AlkPhos  134<H>  10    PT/INR - ( 13 Oct 2019 12:42 )   PT: 17.7 sec;   INR: 1.57 ratio      PTT - ( 13 Oct 2019 12:42 )  PTT:31.9 sec      CAPILLARY BLOOD GLUCOSE  POCT Blood Glucose.: 104 mg/dL (14 Oct 2019 18:13)  POCT Blood Glucose.: 99 mg/dL (14 Oct 2019 11:44)  POCT Blood Glucose.: 110 mg/dL (14 Oct 2019 05:59)  POCT Blood Glucose.: 118 mg/dL (14 Oct 2019 00:16)        Urinalysis Basic - ( 13 Oct 2019 13:05 )  Color: Yellow / Appearance: Clear / S.010 / pH: x  Gluc: x / Ketone: Negative  / Bili: Negative / Urobili: Negative   Blood: x / Protein: Negative / Nitrite: Negative   Leuk Esterase: Negative / RBC: x / WBC x   Sq Epi: x / Non Sq Epi: x / Bacteria: x        MEDICATIONS  (STANDING):  dextrose 5%. 1000 milliLiter(s) (50 mL/Hr) IV Continuous <Continuous>  dextrose 50% Injectable 12.5 Gram(s) IV Push once  dextrose 50% Injectable 25 Gram(s) IV Push once  dextrose 50% Injectable 25 Gram(s) IV Push once  insulin lispro (HumaLOG) corrective regimen sliding scale   SubCutaneous three times a day before meals  insulin lispro (HumaLOG) corrective regimen sliding scale   SubCutaneous at bedtime  melatonin 3 milliGRAM(s) Oral at bedtime  pantoprazole    Tablet 40 milliGRAM(s) Oral before breakfast  piperacillin/tazobactam IVPB.. 3.375 Gram(s) IV Intermittent every 8 hours  sodium chloride 0.9%. 1000 milliLiter(s) (50 mL/Hr) IV Continuous <Continuous>    MEDICATIONS  (PRN):  dextrose 40% Gel 15 Gram(s) Oral once PRN Blood Glucose LESS THAN 70 milliGRAM(s)/deciliter  glucagon  Injectable 1 milliGRAM(s) IntraMuscular once PRN Glucose LESS THAN 70 milligrams/deciliter  ibuprofen  Tablet. 200 milliGRAM(s) Oral three times a day PRN Mild Pain (1 - 3)        RADIOLOGY & ADDITIONAL TESTS:    < from: CT Chest No Cont (10.16.19 @ 12:50) >  Bilateral lower lobe and right middle lobe discoid atelectasis. Mild   cardiomegaly.    Hepatomegaly and hepatic steatosis.          < end of copied text >    10/15/19 : CT Abdomen and Pelvis w/ IV Cont (10.15.19 @ 11:12)  Small pulmonary consolidation in the medial right middle lobe with air bronchogram may represent pneumonia. Follow-up chest CT in 4-6 weeks is recommended to ensure resolution.  Hepatic steatosis and hepatomegaly again noted.  Mild undulating contour of the liver may represent cirrhosis. Clinical  correlation is recommended. Splenomegaly and mild ascites.    Mild gallbladder wall thickening versus pericholecystic fluid. No CT evidence for gallstone. If there is a clinical suspicion for acute  cholecystitis, HIDA scan may be pursued for further evaluation.  Mild common bile duct dilatation.    Mild stranding adjacent to the pancreatic head and duodenum may represent  acute pancreatitis or nonspecific duodenitis. Clinical correlation with  pancreatic enzymes is recommended. No evidence for pancreatic necrosis. Mildly enlarged 1.4 cm peripancreatic lymph node.      10/14/19 : MR MRCP w/wo IV Cont (10.14.19 @ 17:21) Small volume upper abdominal ascites, of uncertain etiology. Consider further evaluation with contrast enhanced CT abdomen and pelvis. Dilated common bile duct, measuring 0.9 cm. No cholelithiasis or choledocholithiasis.      10/13/19 : US Hepatic & Pancreatic (10.13.19 @ 16:38)   No cholelithiasis. Dilated common bile duct. A definite obstructing calculus is not visualized. MRI/MRCP suggested for further   evaluation.        MICROBIOLOGY DATA:    Culture - Blood (10.13.19 @ 14:30)    Specimen Source: .Blood    Culture Results:   No growth to date.    Culture - Blood (10.13.19 @ 14:30)    Specimen Source: .Blood    Culture Results:   No growth to date.      Culture - Urine (10.13.19 @ 21:58)    Specimen Source: .Urine    Culture Results:   <10,000 CFU/mL Normal Urogenital Daysi Patient is seen and examined at the bed side, is afebrile. She continues feeling better. The  CT abd/pelvis shows RML consolidation and acute pancreatitis or nonspecific duodenitis.        REVIEW OF SYSTEMS: All other review systems are negative        ALLERGIES: No Known Allergies      Vital Signs Last 24 Hrs  T(C): 37.4 (16 Oct 2019 13:45), Max: 37.4 (16 Oct 2019 13:45)  T(F): 99.3 (16 Oct 2019 13:45), Max: 99.3 (16 Oct 2019 13:45)  HR: 82 (16 Oct 2019 13:45) (72 - 82)  BP: 123/77 (16 Oct 2019 13:45) (99/65 - 123/77)  BP(mean): --  RR: 16 (16 Oct 2019 13:45) (16 - 17)  SpO2: 100% (16 Oct 2019 13:45) (96% - 100%)      PHYSICAL EXAM:  GENERAL: Not in distress   CHEST/LUNG:  Air entry bilaterally  HEART: s1 and s2 present  ABDOMEN: RUQ tenderness resolving  EXTREMITIES: No pedal  edema  CNS: Awake and Alert        LABS: No new CBC                        11.6   8.77  )-----------( 266      ( 15 Oct 2019 08:19 )             35.9                  12.5   7.61  )-----------( 267      ( 14 Oct 2019 08:46 )             38.4       10-16    134<L>  |  103  |  6<L>  ----------------------------<  131<H>  3.6   |  25  |  0.74    Ca    8.3<L>      16 Oct 2019 07:16    TPro  7.5  /  Alb  3.0<L>  /  TBili  0.7  /  DBili  x   /  AST  360<H>  /  ALT  585<H>  /  AlkPhos  142<H>  10-16    10-15    137  |  105  |  8   ----------------------------<  133<H>  3.4<L>   |  24  |  0.61    Ca    8.2<L>      15 Oct 2019 08:19    TPro  6.8  /  Alb  2.8<L>  /  TBili  0.6  /  DBili  x   /  AST  331<H>  /  ALT  514<H>  /  AlkPhos  129<H>  10-15      10-14    140  |  107  |  6<L>  ----------------------------<  102<H>  3.5   |  27  |  0.65    Ca    8.0<L>      14 Oct 2019 08:46    TPro  7.2  /  Alb  3.0<L>  /  TBili  0.6  /  DBili  x   /  AST  317<H>  /  ALT  519<H>  /  AlkPhos  134<H>  10    PT/INR - ( 13 Oct 2019 12:42 )   PT: 17.7 sec;   INR: 1.57 ratio      PTT - ( 13 Oct 2019 12:42 )  PTT:31.9 sec      CAPILLARY BLOOD GLUCOSE  POCT Blood Glucose.: 104 mg/dL (14 Oct 2019 18:13)  POCT Blood Glucose.: 99 mg/dL (14 Oct 2019 11:44)  POCT Blood Glucose.: 110 mg/dL (14 Oct 2019 05:59)  POCT Blood Glucose.: 118 mg/dL (14 Oct 2019 00:16)        Urinalysis Basic - ( 13 Oct 2019 13:05 )  Color: Yellow / Appearance: Clear / S.010 / pH: x  Gluc: x / Ketone: Negative  / Bili: Negative / Urobili: Negative   Blood: x / Protein: Negative / Nitrite: Negative   Leuk Esterase: Negative / RBC: x / WBC x   Sq Epi: x / Non Sq Epi: x / Bacteria: x        MEDICATIONS  (STANDING):  dextrose 5%. 1000 milliLiter(s) (50 mL/Hr) IV Continuous <Continuous>  dextrose 50% Injectable 12.5 Gram(s) IV Push once  dextrose 50% Injectable 25 Gram(s) IV Push once  dextrose 50% Injectable 25 Gram(s) IV Push once  insulin lispro (HumaLOG) corrective regimen sliding scale   SubCutaneous three times a day before meals  insulin lispro (HumaLOG) corrective regimen sliding scale   SubCutaneous at bedtime  melatonin 3 milliGRAM(s) Oral at bedtime  pantoprazole    Tablet 40 milliGRAM(s) Oral before breakfast  piperacillin/tazobactam IVPB.. 3.375 Gram(s) IV Intermittent every 8 hours  sodium chloride 0.9%. 1000 milliLiter(s) (50 mL/Hr) IV Continuous <Continuous>    MEDICATIONS  (PRN):  dextrose 40% Gel 15 Gram(s) Oral once PRN Blood Glucose LESS THAN 70 milliGRAM(s)/deciliter  glucagon  Injectable 1 milliGRAM(s) IntraMuscular once PRN Glucose LESS THAN 70 milligrams/deciliter  ibuprofen  Tablet. 200 milliGRAM(s) Oral three times a day PRN Mild Pain (1 - 3)        RADIOLOGY & ADDITIONAL TESTS:    < from: CT Chest No Cont (10.16.19 @ 12:50) >  Bilateral lower lobe and right middle lobe discoid atelectasis. Mild   cardiomegaly.    Hepatomegaly and hepatic steatosis.          10/15/19 : CT Abdomen and Pelvis w/ IV Cont (10.15.19 @ 11:12)  Small pulmonary consolidation in the medial right middle lobe with air bronchogram may represent pneumonia. Follow-up chest CT in 4-6 weeks is recommended to ensure resolution.  Hepatic steatosis and hepatomegaly again noted.  Mild undulating contour of the liver may represent cirrhosis. Clinical  correlation is recommended. Splenomegaly and mild ascites.    Mild gallbladder wall thickening versus pericholecystic fluid. No CT evidence for gallstone. If there is a clinical suspicion for acute  cholecystitis, HIDA scan may be pursued for further evaluation.  Mild common bile duct dilatation.    Mild stranding adjacent to the pancreatic head and duodenum may represent  acute pancreatitis or nonspecific duodenitis. Clinical correlation with  pancreatic enzymes is recommended. No evidence for pancreatic necrosis. Mildly enlarged 1.4 cm peripancreatic lymph node.      10/14/19 : MR MRCP w/wo IV Cont (10.14.19 @ 17:21) Small volume upper abdominal ascites, of uncertain etiology. Consider further evaluation with contrast enhanced CT abdomen and pelvis. Dilated common bile duct, measuring 0.9 cm. No cholelithiasis or choledocholithiasis.      10/13/19 : US Hepatic & Pancreatic (10.13.19 @ 16:38)   No cholelithiasis. Dilated common bile duct. A definite obstructing calculus is not visualized. MRI/MRCP suggested for further   evaluation.        MICROBIOLOGY DATA:    Culture - Blood (10.13.19 @ 14:30)    Specimen Source: .Blood    Culture Results:   No growth to date.    Culture - Blood (10.13.19 @ 14:30)    Specimen Source: .Blood    Culture Results:   No growth to date.      Culture - Urine (10.13.19 @ 21:58)    Specimen Source: .Urine    Culture Results:   <10,000 CFU/mL Normal Urogenital Daysi

## 2019-10-16 NOTE — PROGRESS NOTE ADULT - NEUROLOGICAL DETAILS
cranial nerves intact/responds to verbal commands/responds to pain/sensation intact
responds to pain/cranial nerves intact/sensation intact/alert and oriented x 3/responds to verbal commands

## 2019-10-16 NOTE — PROGRESS NOTE ADULT - CONSTITUTIONAL DETAILS
no distress/well-groomed/well-nourished/well-developed
well-groomed/well-nourished/well-developed/no distress
well-developed/well-groomed/well-nourished

## 2019-10-16 NOTE — PROGRESS NOTE ADULT - PROBLEM SELECTOR PLAN 3
-AST/ ALT trending up, unclear etiology   -Acute Hepatitis panel not detected.  -GI Dr. Cash   -Avoid hepatotoxic agents  -Trend LFT's

## 2019-10-16 NOTE — PROGRESS NOTE ADULT - GASTROINTESTINAL DETAILS
no rebound tenderness/no guarding/no distention/no rigidity/nontender/soft/bowel sounds normal
no guarding/nontender/no distention/bowel sounds normal/soft/no rebound tenderness/no rigidity
no rebound tenderness/no rigidity/bowel sounds normal/no guarding/nontender/soft/no distention

## 2019-10-16 NOTE — CONSULT NOTE ADULT - PROBLEM SELECTOR RECOMMENDATION 3
Pain control   IFV  Tolerating diet  CT abdomen noter  GI follow up monitor CXR  likely secondary to IVF

## 2019-10-16 NOTE — CONSULT NOTE ADULT - SUBJECTIVE AND OBJECTIVE BOX
PULMONARY CONSULT NOTE      DENA BARRAZA  MRN-[id number]    Patient is a 39y old  Female who presents with a chief complaint of Fever and RUQ abdominal pain X 6 days (16 Oct 2019 10:04)    History of Present Illness:  Reason for Admission: Fever and RUQ abdominal pain X 6 days	  History of Present Illness: 	  39 year old F with PMH of DM came to the ED with fever and RUQ abdominal pain that started 6 days ago. Pt states that she went for a family visit to [place] the last week when she started spiking fever of 101-102 F everyday. 2 days later patient started feeling RUQ abdominal pain that is dull, progressively increasing, non radiating, aggravated by movement like laughing, walking and coughing; ibuprofen did not help relieve the pain. The pain is not related to food intake though the symptoms started after feast during the festival which did consist of fatty food. Pt went to ED in [place] and got US Abdomen done which showed no GB stones but some pericholecystic fluid and LFTs were elevate. Pt wanted to get further workup done in [place]. Pt had 1 episode of vomiting on Monday. Pt denies nausea, changes is color of stool or urine, diarrhea/constipation, nausea, changes in urinary habits, myalgia, joint pains or rash.        HISTORY OF PRESENT ILLNESS:As above , awake , alert , lying in bed in NAD.    MEDICATIONS  (STANDING):  dextrose 5%. 1000 milliLiter(s) (50 mL/Hr) IV Continuous <Continuous>  dextrose 50% Injectable 12.5 Gram(s) IV Push once  dextrose 50% Injectable 25 Gram(s) IV Push once  dextrose 50% Injectable 25 Gram(s) IV Push once  insulin lispro (HumaLOG) corrective regimen sliding scale   SubCutaneous three times a day before meals  insulin lispro (HumaLOG) corrective regimen sliding scale   SubCutaneous at bedtime  melatonin 3 milliGRAM(s) Oral at bedtime  pantoprazole    Tablet 40 milliGRAM(s) Oral before breakfast  piperacillin/tazobactam IVPB.. 3.375 Gram(s) IV Intermittent every 8 hours  sodium chloride 0.9%. 1000 milliLiter(s) (50 mL/Hr) IV Continuous <Continuous>      MEDICATIONS  (PRN):  dextrose 40% Gel 15 Gram(s) Oral once PRN Blood Glucose LESS THAN 70 milliGRAM(s)/deciliter  glucagon  Injectable 1 milliGRAM(s) IntraMuscular once PRN Glucose LESS THAN 70 milligrams/deciliter  ibuprofen  Tablet. 200 milliGRAM(s) Oral three times a day PRN Mild Pain (1 - 3)      Allergies    No Known Allergies    Intolerances        PAST MEDICAL & SURGICAL HISTORY:  DM (diabetes mellitus)  No significant past surgical history      FAMILY HISTORY:  No pertinent family history in first degree relatives      SOCIAL HISTORY  Smoking History:     REVIEW OF SYSTEMS:    CONSTITUTIONAL:  No fevers, chills, sweats    HEENT:  Eyes:  No diplopia or blurred vision. ENT:  No earache, sore throat or runny nose.    CARDIOVASCULAR:  No pressure, squeezing, tightness, or heaviness about the chest; no palpitations.    RESPIRATORY:  Per HPI    GASTROINTESTINAL:  No abdominal pain, nausea, vomiting or diarrhea.    GENITOURINARY:  No dysuria, frequency or urgency.    NEUROLOGIC:  No paresthesias, fasciculations, seizures or weakness.    PSYCHIATRIC:  No disorder of thought or mood.    Vital Signs Last 24 Hrs  T(C): 37.3 (16 Oct 2019 05:44), Max: 37.3 (15 Oct 2019 20:47)  T(F): 99.2 (16 Oct 2019 05:44), Max: 99.2 (16 Oct 2019 05:44)  HR: 77 (16 Oct 2019 05:44) (72 - 78)  BP: 99/65 (16 Oct 2019 05:44) (99/65 - 112/79)  BP(mean): --  RR: 17 (16 Oct 2019 05:44) (16 - 17)  SpO2: 96% (16 Oct 2019 05:44) (96% - 99%)  I&O's Detail      PHYSICAL EXAMINATION:    GENERAL: The patient is a well-developed, well-nourished _____in no apparent distress.     HEENT: Head is normocephalic and atraumatic. Extraocular muscles are intact. Mucous membranes are moist.     NECK: Supple.     LUNGS: Clear to auscultation without wheezing, rales, or rhonchi. Respirations unlabored    HEART: Regular rate and rhythm without murmur.    ABDOMEN: Soft, nontender, and nondistended.  No hepatosplenomegaly is noted.    EXTREMITIES: Without any cyanosis, clubbing, rash, lesions or edema.    NEUROLOGIC: Grossly intact.      LABS:                        11.6   8.77  )-----------( 266      ( 15 Oct 2019 08:19 )             35.9     10-16    134<L>  |  103  |  6<L>  ----------------------------<  131<H>  3.6   |  25  |  0.74    Ca    8.3<L>      16 Oct 2019 07:16    TPro  7.5  /  Alb  3.0<L>  /  TBili  0.7  /  DBili  x   /  AST  360<H>  /  ALT  585<H>  /  AlkPhos  142<H>  10-16                        MICROBIOLOGY:    RADIOLOGY & ADDITIONAL STUDIES:  < from: CT Abdomen and Pelvis w/ IV Cont (10.15.19 @ 11:12) >  Impression: Small pulmonary consolidation in the medial right middle lobe   with air bronchogram may represent pneumonia. Follow-up chest CT in 4-6   weeks is recommended to ensure resolution.    Hepatic steatosis and hepatomegaly again noted.    Mild undulating contour of the liver may represent cirrhosis. Clinical   correlation is recommended. Splenomegaly and mild ascites.    Mild gallbladder wall thickening versus pericholecystic fluid. No CT   evidence for gallstone. If there is a clinical suspicion for acute   cholecystitis, HIDA scan may be pursued for further evaluation.     Mild common bile duct dilatation.    Mild stranding adjacent to the pancreatic head and duodenum may represent   acute pancreatitis or nonspecific duodenitis. Clinical correlation with   pancreatic enzymes is recommended. No evidence for pancreatic necrosis.    Mildly enlarged 1.4 cm peripancreatic lymph node.      < end of copied text >      CXR:    Ct scan chest:  < from: CT Abdomen and Pelvis w/ IV Cont (10.15.19 @ 11:12) >  Findings:Limited sections through the lung bases demonstrate trace left   pleural effusion. Small bilateral atelectasis. Small consolidation in the   medial right middle lobe with air bronchogram.    < end of copied text >    ekg;    echo: PULMONARY CONSULT NOTE      DENA BARRAZA  [id number]    Patient is a 39y old  Female who presents with a chief complaint of Fever and RUQ abdominal pain X 6 days (16 Oct 2019 10:04)    History of Present Illness:  Reason for Admission: Fever and RUQ abdominal pain X 6 days	  History of Present Illness: 	  39 year old F with PMH of DM came to the ED with fever and RUQ abdominal pain that started 6 days ago. Pt states that she went for a family visit to [place] the last week when she started spiking fever of 101-102 F everyday. 2 days later patient started feeling RUQ abdominal pain that is dull, progressively increasing, non radiating, aggravated by movement like laughing, walking and coughing; ibuprofen did not help relieve the pain. The pain is not related to food intake though the symptoms started after feast during the festival which did consist of fatty food. Pt went to ED in [place] and got US Abdomen done which showed no GB stones but some pericholecystic fluid and LFTs were elevate. Pt wanted to get [place]. Pt had 1 episode of vomiting on Monday. Pt denies nausea, changes is color of stool or urine, diarrhea/constipation, nausea, changes in urinary habits, myalgia, joint pains or rash.        HISTORY OF PRESENT ILLNESS:As above.  Awake , alert , lying in bed in NAD. Occasional cough. + Hx of smoking    MEDICATIONS  (STANDING):  dextrose 5%. 1000 milliLiter(s) (50 mL/Hr) IV Continuous <Continuous>  dextrose 50% Injectable 12.5 Gram(s) IV Push once  dextrose 50% Injectable 25 Gram(s) IV Push once  dextrose 50% Injectable 25 Gram(s) IV Push once  insulin lispro (HumaLOG) corrective regimen sliding scale   SubCutaneous three times a day before meals  insulin lispro (HumaLOG) corrective regimen sliding scale   SubCutaneous at bedtime  melatonin 3 milliGRAM(s) Oral at bedtime  pantoprazole    Tablet 40 milliGRAM(s) Oral before breakfast  piperacillin/tazobactam IVPB.. 3.375 Gram(s) IV Intermittent every 8 hours  sodium chloride 0.9%. 1000 milliLiter(s) (50 mL/Hr) IV Continuous <Continuous>      MEDICATIONS  (PRN):  dextrose 40% Gel 15 Gram(s) Oral once PRN Blood Glucose LESS THAN 70 milliGRAM(s)/deciliter  glucagon  Injectable 1 milliGRAM(s) IntraMuscular once PRN Glucose LESS THAN 70 milligrams/deciliter  ibuprofen  Tablet. 200 milliGRAM(s) Oral three times a day PRN Mild Pain (1 - 3)      Allergies    No Known Allergies    Intolerances        PAST MEDICAL & SURGICAL HISTORY:  DM (diabetes mellitus)  No significant past surgical history      FAMILY HISTORY:  No pertinent family history in first degree relatives      SOCIAL HISTORY  Smoking History:     REVIEW OF SYSTEMS:    CONSTITUTIONAL:  No fevers, chills, sweats    HEENT:  Eyes:  No diplopia or blurred vision. ENT:  No earache, sore throat or runny nose.    CARDIOVASCULAR:  No pressure, squeezing, tightness, or heaviness about the chest; no palpitations.    RESPIRATORY:  Per HPI    GASTROINTESTINAL:  No abdominal pain, nausea, vomiting or diarrhea.    GENITOURINARY:  No dysuria, frequency or urgency.    NEUROLOGIC:  No paresthesias, fasciculations, seizures or weakness.    PSYCHIATRIC:  No disorder of thought or mood.    Vital Signs Last 24 Hrs  T(C): 37.3 (16 Oct 2019 05:44), Max: 37.3 (15 Oct 2019 20:47)  T(F): 99.2 (16 Oct 2019 05:44), Max: 99.2 (16 Oct 2019 05:44)  HR: 77 (16 Oct 2019 05:44) (72 - 78)  BP: 99/65 (16 Oct 2019 05:44) (99/65 - 112/79)  BP(mean): --  RR: 17 (16 Oct 2019 05:44) (16 - 17)  SpO2: 96% (16 Oct 2019 05:44) (96% - 99%)  I&O's Detail      PHYSICAL EXAMINATION:    GENERAL: The patient is a well-developed, well-nourished _____in no apparent distress.     HEENT: Head is normocephalic and atraumatic. Extraocular muscles are intact. Mucous membranes are moist.     NECK: Supple.     LUNGS: Bibasillar rales    HEART: Regular rate and rhythm without murmur.    ABDOMEN: Soft, nontender, and nondistended.  No hepatosplenomegaly is noted.    EXTREMITIES: Without any cyanosis, clubbing, rash, lesions or edema.    NEUROLOGIC: Grossly intact.      LABS:                        11.6   8.77  )-----------( 266      ( 15 Oct 2019 08:19 )             35.9     10-16    134<L>  |  103  |  6<L>  ----------------------------<  131<H>  3.6   |  25  |  0.74    Ca    8.3<L>      16 Oct 2019 07:16    TPro  7.5  /  Alb  3.0<L>  /  TBili  0.7  /  DBili  x   /  AST  360<H>  /  ALT  585<H>  /  AlkPhos  142<H>  10-16                        MICROBIOLOGY:    RADIOLOGY & ADDITIONAL STUDIES:  < from: CT Abdomen and Pelvis w/ IV Cont (10.15.19 @ 11:12) >  Impression: Small pulmonary consolidation in the medial right middle lobe   with air bronchogram may represent pneumonia. Follow-up chest CT in 4-6   weeks is recommended to ensure resolution.    Hepatic steatosis and hepatomegaly again noted.    Mild undulating contour of the liver may represent cirrhosis. Clinical   correlation is recommended. Splenomegaly and mild ascites.    Mild gallbladder wall thickening versus pericholecystic fluid. No CT   evidence for gallstone. If there is a clinical suspicion for acute   cholecystitis, HIDA scan may be pursued for further evaluation.     Mild common bile duct dilatation.    Mild stranding adjacent to the pancreatic head and duodenum may represent   acute pancreatitis or nonspecific duodenitis. Clinical correlation with   pancreatic enzymes is recommended. No evidence for pancreatic necrosis.    Mildly enlarged 1.4 cm peripancreatic lymph node.      < end of copied text >      CXR:    Ct scan chest:  < from: CT Abdomen and Pelvis w/ IV Cont (10.15.19 @ 11:12) >  Findings:Limited sections through the lung bases demonstrate trace left   pleural effusion. Small bilateral atelectasis. Small consolidation in the   medial right middle lobe with air bronchogram.    < end of copied text >    ekg;    echo:

## 2019-10-16 NOTE — PROGRESS NOTE ADULT - ASSESSMENT
Patient is a 39y old  Female  with PMH of DM presents to the ER for evaluation of fever and RUQ abdominal pain. Pt states that she went for a family visit to [place] the last week when she started spiking fever of 101-102 F everyday. 2 days later she developed  RUQ abdominal pain.  The pain is not related to food intake though the symptoms started after feast during the festival which did consist of fatty food. Pt went to ED in [place] and got US Abdomen done which showed no GB stones but some pericholecystic fluid and LFTs were elevate. In ER, she found to have fever, T 102 F and tachycardia. The Labs significant for elevated LFTS with US showing dilated CBD with no stone visible. She was given 1 dose of zosyn, and The ID consult requested to assist with further evaluation and antibiotic management.     # Sepsis ( fever + tachycardia )  # Cholangitis -   # RML consolidation  # acute pancreatitis or nonspecific duodenitis.     would recommend:    1. Follow up HIDA scan  2. Continue zosyn inpatient and May change to oral Levaquin and Flagyl on discharge to complete the 10 days course    d/w patient and her  at the bed side    will follow the patient with you Patient is a 39y old  Female  with PMH of DM presents to the ER for evaluation of fever and RUQ abdominal pain. Pt states that she went for a family visit to [place] the last week when she started spiking fever of 101-102 F everyday. 2 days later she developed  RUQ abdominal pain.  The pain is not related to food intake though the symptoms started after feast during the festival which did consist of fatty food. Pt went to ED in [place] and got US Abdomen done which showed no GB stones but some pericholecystic fluid and LFTs were elevate. In ER, she found to have fever, T 102 F and tachycardia. The Labs significant for elevated LFTS with US showing dilated CBD with no stone visible. She was given 1 dose of zosyn, and The ID consult requested to assist with further evaluation and antibiotic management.     # Sepsis ( fever + tachycardia )  # Cholangitis -   # RML consolidation  # acute pancreatitis or nonspecific duodenitis.     would recommend:    1. Follow up HIDA scan  2. Continue zosyn inpatient and May change to oral Levaquin and Flagyl on discharge to complete the 10 days course  3. OOB to chair    d/w patient   will follow the patient with you

## 2019-10-16 NOTE — PROGRESS NOTE ADULT - SUBJECTIVE AND OBJECTIVE BOX
HPI: 39 year old F with PMH of DM , and recent travel to [place], who presented to ED with fever and RUQ abdominal pain x 6 days, and pericholecystic fluid with elevated LFT's per workup in [place]. In ED, US abdomen shows dilated CBD with no stone , and labs significant for transaminitis. Pt was started on Zosyn for cholangitis vs acute hepatitis. Acute Hepatitis panel non-reactive. MRCP shows Small volume upper abdominal ascites, of uncertain etiology. Dilated common bile duct, measuring 0.9 cm. No cholelithiasis or choledocholithiasis.    OVERNIGHT EVENTS:        REVIEW OF SYSTEMS:  CONSTITUTIONAL: No fever, chills  ENMT:  No difficulty hearing, no change in vision  NECK: No pain or stiffness  RESPIRATORY: No cough, SOB  CARDIOVASCULAR: No chest pain, palpitations  GASTROINTESTINAL: No abdominal pain. No nausea, vomiting, or diarrhea  GENITOURINARY: No dysuria  NEUROLOGICAL: No HA  SKIN: No itching, burning, rashes, or lesions   LYMPH NODES: No enlarged glands  ENDOCRINE: No heat or cold intolerance; No hair loss  MUSCULOSKELETAL: No joint pain or swelling; No muscle, back, or extremity pain  PSYCHIATRIC: No depression, anxiety  HEME/LYMPH: No easy bruising, or bleeding gums    T(C): 37.4 (10-16-19 @ 13:45), Max: 37.4 (10-16-19 @ 13:45)  HR: 82 (10-16-19 @ 13:45) (72 - 82)  BP: 123/77 (10-16-19 @ 13:45) (99/65 - 123/77)  RR: 16 (10-16-19 @ 13:45) (16 - 17)  SpO2: 100% (10-16-19 @ 13:45) (96% - 100%)  Wt(kg): --Vital Signs Last 24 Hrs  T(C): 37.4 (16 Oct 2019 13:45), Max: 37.4 (16 Oct 2019 13:45)  T(F): 99.3 (16 Oct 2019 13:45), Max: 99.3 (16 Oct 2019 13:45)  HR: 82 (16 Oct 2019 13:45) (72 - 82)  BP: 123/77 (16 Oct 2019 13:45) (99/65 - 123/77)  BP(mean): --  RR: 16 (16 Oct 2019 13:45) (16 - 17)  SpO2: 100% (16 Oct 2019 13:45) (96% - 100%)    PHYSICAL EXAM:  GENERAL: NAD  EYES: clear conjunctiva; EOMI  ENMT: Moist mucous membranes  NECK: Supple, No JVD, Normal thyroid  CHEST/LUNG: Clear to auscultation bilaterally; No rales, rhonchi, wheezing, or rubs  HEART: S1, S2, Regular rate and rhythm  ABDOMEN: Soft, Nontender, Nondistended; Bowel sounds present  NEURO: Alert & Oriented X3  EXTREMITIES: No LE edema, no calf tenderness  LYMPH: No lymphadenopathy noted  SKIN: No rashes or lesions    Consultant(s) Notes Reviewed:  [x ] YES  [ ] NO  Care Discussed with Consultants/Other Providers [ x] YES  [ ] NO    LABS:                        11.6   8.77  )-----------( 266      ( 15 Oct 2019 08:19 )             35.9     10-16    134<L>  |  103  |  6<L>  ----------------------------<  131<H>  3.6   |  25  |  0.74    Ca    8.3<L>      16 Oct 2019 07:16    TPro  7.5  /  Alb  3.0<L>  /  TBili  0.7  /  DBili  x   /  AST  360<H>  /  ALT  585<H>  /  AlkPhos  142<H>  10-16        CAPILLARY BLOOD GLUCOSE      POCT Blood Glucose.: 167 mg/dL (16 Oct 2019 16:30)  POCT Blood Glucose.: 165 mg/dL (16 Oct 2019 11:37)  POCT Blood Glucose.: 121 mg/dL (16 Oct 2019 08:00)  POCT Blood Glucose.: 139 mg/dL (15 Oct 2019 21:08)            RADIOLOGY & ADDITIONAL TESTS:    Imaging Personally Reviewed:  [ ] YES  [ ] NO HPI: 39 year old F with PMH of DM , and recent travel to [place], who presented to ED with fever and RUQ abdominal pain x 6 days, and pericholecystic fluid with elevated LFT's per workup in [place]. In ED, US abdomen shows dilated CBD with no stone , and labs significant for transaminitis. Pt was started on Zosyn for cholangitis vs acute hepatitis. Acute Hepatitis panel non-reactive. MRCP shows Small volume upper abdominal ascites, of uncertain etiology. Dilated common bile duct, measuring 0.9 cm. No cholelithiasis or choledocholithiasis.  CTA of abdomen with no evidence for gallstone and can not rule out acute cholecystitis   HIDA scan ordered for further evaluation     OVERNIGHT EVENTS: no acute  events overnight, tolerating PO diet     REVIEW OF SYSTEMS:  CONSTITUTIONAL: No fever, chills  NECK: No pain or stiffness  RESPIRATORY: No cough, SOB  CARDIOVASCULAR: No chest pain, palpitations  GASTROINTESTINAL: No abdominal pain. No nausea, vomiting, or diarrhea  GENITOURINARY: No dysuria  NEUROLOGICAL: No HA  MUSCULOSKELETAL: No joint pain or swelling; No muscle, back, or extremity pain    T(C): 37.4 (10-16-19 @ 13:45), Max: 37.4 (10-16-19 @ 13:45)  HR: 82 (10-16-19 @ 13:45) (72 - 82)  BP: 123/77 (10-16-19 @ 13:45) (99/65 - 123/77)  RR: 16 (10-16-19 @ 13:45) (16 - 17)  SpO2: 100% (10-16-19 @ 13:45) (96% - 100%)  Wt(kg): --Vital Signs Last 24 Hrs  T(C): 37.4 (16 Oct 2019 13:45), Max: 37.4 (16 Oct 2019 13:45)  T(F): 99.3 (16 Oct 2019 13:45), Max: 99.3 (16 Oct 2019 13:45)  HR: 82 (16 Oct 2019 13:45) (72 - 82)  BP: 123/77 (16 Oct 2019 13:45) (99/65 - 123/77)  BP(mean): --  RR: 16 (16 Oct 2019 13:45) (16 - 17)  SpO2: 100% (16 Oct 2019 13:45) (96% - 100%)    MEDICATIONS  (STANDING):  dextrose 5%. 1000 milliLiter(s) (50 mL/Hr) IV Continuous <Continuous>  dextrose 50% Injectable 12.5 Gram(s) IV Push once  dextrose 50% Injectable 25 Gram(s) IV Push once  dextrose 50% Injectable 25 Gram(s) IV Push once  insulin lispro (HumaLOG) corrective regimen sliding scale   SubCutaneous three times a day before meals  insulin lispro (HumaLOG) corrective regimen sliding scale   SubCutaneous at bedtime  melatonin 3 milliGRAM(s) Oral at bedtime  pantoprazole    Tablet 40 milliGRAM(s) Oral before breakfast  piperacillin/tazobactam IVPB.. 3.375 Gram(s) IV Intermittent every 8 hours  sodium chloride 0.9%. 1000 milliLiter(s) (50 mL/Hr) IV Continuous <Continuous>    MEDICATIONS  (PRN):  dextrose 40% Gel 15 Gram(s) Oral once PRN Blood Glucose LESS THAN 70 milliGRAM(s)/deciliter  glucagon  Injectable 1 milliGRAM(s) IntraMuscular once PRN Glucose LESS THAN 70 milligrams/deciliter  ibuprofen  Tablet. 200 milliGRAM(s) Oral three times a day PRN Mild Pain (1 - 3)      PHYSICAL EXAM:  GENERAL: NAD  ENMT: Moist mucous membranes  NECK: Supple, No JVD  CHEST/LUNG: Clear to auscultation bilaterally; No rales, rhonchi, wheezing, or rubs  HEART: S1, S2, Regular rate and rhythm  ABDOMEN: Soft, Nontender, Nondistended; Bowel sounds present  NEURO: Alert & Oriented X3  EXTREMITIES: No LE edema, no calf tenderness    Consultant(s) Notes Reviewed:  [x ] YES  [ ] NO  Care Discussed with Consultants/Other Providers [ x] YES  [ ] NO    LABS:                        11.6   8.77  )-----------( 266      ( 15 Oct 2019 08:19 )             35.9     10-16    134<L>  |  103  |  6<L>  ----------------------------<  131<H>  3.6   |  25  |  0.74    Ca    8.3<L>      16 Oct 2019 07:16    TPro  7.5  /  Alb  3.0<L>  /  TBili  0.7  /  DBili  x   /  AST  360<H>  /  ALT  585<H>  /  AlkPhos  142<H>  10-16        CAPILLARY BLOOD GLUCOSE      POCT Blood Glucose.: 167 mg/dL (16 Oct 2019 16:30)  POCT Blood Glucose.: 165 mg/dL (16 Oct 2019 11:37)  POCT Blood Glucose.: 121 mg/dL (16 Oct 2019 08:00)  POCT Blood Glucose.: 139 mg/dL (15 Oct 2019 21:08)    RADIOLOGY & ADDITIONAL TESTS:    < from: CT Chest No Cont (10.16.19 @ 12:50) >    EXAM:  CT CHEST                            PROCEDURE DATE:  10/16/2019          INTERPRETATION:  CLINICAL INDICATION: 39 years  Female with evaluate for   PNA. Cholangitis    COMPARISON: None.    PROCEDURE:   CT of the Chest was performed without intravenous contrast.  Sagittal and coronal reformats were performed.      FINDINGS:    LUNGS AND AIRWAYS: Patent central airways.  Bilateral lower lobe and   right middle lobe discoid atelectasis. No focal consolidation or dominant   mass.    PLEURA: No pleural effusion.    MEDIASTINUM AND CARISSA: No lymphadenopathy.    VESSELS: Within normal limits.    HEART: Mild cardiomegaly. No pericardial effusion.    CHEST WALL AND LOWER NECK: Within normal limits.    VISUALIZED UPPER ABDOMEN: Enlarged liver. The left lobe extends across   the midline draping over the spleen. Hepatic steatosis.    BONES: Within normal limits.    IMPRESSION:     Bilateral lower lobe and right middle lobe discoid atelectasis. Mild   cardiomegaly.    Hepatomegaly and hepatic steatosis.    < end of copied text >      < from: CT Abdomen and Pelvis w/ IV Cont (10.15.19 @ 11:12) >    EXAM:  CT ABDOMEN AND PELVIS IC                            PROCEDURE DATE:  10/15/2019          INTERPRETATION:  CT of the abdomen and pelvis with IV contrast    Clinical Indication: Right upper quadrant abdominal pain, dilated common   bile duct and periportal edema    Technique: Axial multidetector CT images of the abdomen and pelvis are   acquired following the administration of oral and IV contrast (90 cc   Omnipaque-350 administered, 10 cc discarded).    Comparison: 3/25/2018.    Findings:Limited sections through the lung bases demonstrate trace left   pleural effusion. Small bilateral atelectasis. Small consolidation in the   medial right middle lobe with air bronchogram.    Hepatic steatosis and hepatomegaly. Small calcifications inthe liver,   likely due to prior granulomatous disease. Mild undulating contour of the   liver may represent cirrhosis. Clinical correlation is recommended.    Mild gallbladder wall thickening versus pericholecystic fluid. No CT   evidence for gallstone. The common bile duct is dilated at 8 mm in   caliber.     Mild stranding adjacent to the pancreatic head and duodenum may represent   acute pancreatitis or nonspecific duodenitis. No evidence for pancreatic   necrosis.    Splenomegaly at 14.7 cm.    The adrenals and kidneys appear unremarkable. No evidence for a ureteral   calculus. No hydronephrosis.    The appendix maintains normal caliber. No bowel obstruction or grossly   thickened bowel wall.    Mild ascites in the abdomen and pelvis.    No evidence for free air. Mildly enlarged 1.4 cm peripancreatic lymph   node (image 54 series 2).    The urinary appears unremarkable. Small enhancing lesion in the   myometrium, likely representing a fibroid.    Impression: Small pulmonary consolidation in the medial right middle lobe   with air bronchogram may represent pneumonia. Follow-up chest CT in 4-6   weeks is recommended to ensure resolution.    Hepatic steatosis and hepatomegaly again noted.    Mild undulating contour of the liver may represent cirrhosis. Clinical   correlation is recommended. Splenomegaly and mild ascites.    Mild gallbladder wall thickening versus pericholecystic fluid. No CT   evidence for gallstone. If there is a clinical suspicion for acute   cholecystitis, HIDA scan may be pursued for further evaluation.     Mild common bile duct dilatation.    Mild stranding adjacent to the pancreatic head and duodenum may represent   acute pancreatitis or nonspecific duodenitis. Clinical correlation with   pancreatic enzymes is recommended. No evidence for pancreatic necrosis.    Mildly enlarged 1.4 cm peripancreatic lymph node.              < end of copied text >      Imaging Personally Reviewed:  [ ] YES  [ ] NO

## 2019-10-16 NOTE — PROGRESS NOTE ADULT - RS GEN PE MLT RESP DETAILS PC
breath sounds equal/good air movement/airway obstructed/airway patent/no wheezes
breath sounds equal/good air movement/airway patent
no rhonchi/airway patent/no rales/breath sounds equal

## 2019-10-16 NOTE — PROGRESS NOTE ADULT - SUBJECTIVE AND OBJECTIVE BOX
[   ] ICU                                          [   ] CCU                                      [  X ] Medical Floor      Patient is comfortable. No new complaints reported, No abdominal pain, N/V, hematemesis, hematochezia, melena, fever, chills, chest pain, SOB, cough or diarrhea reported.    VITALS  Vital Signs Last 24 Hrs  T(C): 37.4 (16 Oct 2019 13:45), Max: 37.4 (16 Oct 2019 13:45)  T(F): 99.3 (16 Oct 2019 13:45), Max: 99.3 (16 Oct 2019 13:45)  HR: 82 (16 Oct 2019 13:45) (72 - 82)  BP: 123/77 (16 Oct 2019 13:45) (99/65 - 123/77)   RR: 16 (16 Oct 2019 13:45) (16 - 17)  SpO2: 100% (16 Oct 2019 13:45) (96% - 100%)       MEDICATIONS  (STANDING):  dextrose 5%. 1000 milliLiter(s) (50 mL/Hr) IV Continuous <Continuous>  dextrose 50% Injectable 12.5 Gram(s) IV Push once  dextrose 50% Injectable 25 Gram(s) IV Push once  dextrose 50% Injectable 25 Gram(s) IV Push once  insulin lispro (HumaLOG) corrective regimen sliding scale   SubCutaneous three times a day before meals  insulin lispro (HumaLOG) corrective regimen sliding scale   SubCutaneous at bedtime  melatonin 3 milliGRAM(s) Oral at bedtime  pantoprazole    Tablet 40 milliGRAM(s) Oral before breakfast  piperacillin/tazobactam IVPB.. 3.375 Gram(s) IV Intermittent every 8 hours  sodium chloride 0.9%. 1000 milliLiter(s) (50 mL/Hr) IV Continuous <Continuous>    MEDICATIONS  (PRN):  dextrose 40% Gel 15 Gram(s) Oral once PRN Blood Glucose LESS THAN 70 milliGRAM(s)/deciliter  glucagon  Injectable 1 milliGRAM(s) IntraMuscular once PRN Glucose LESS THAN 70 milligrams/deciliter  ibuprofen  Tablet. 400 milliGRAM(s) Oral once PRN Moderate Pain (4 - 6)  ibuprofen  Tablet. 200 milliGRAM(s) Oral three times a day PRN Mild Pain (1 - 3)                            11.6   8.77  )-----------( 266      ( 15 Oct 2019 08:19 )             35.9       10-16    134<L>  |  103  |  6<L>  ----------------------------<  131<H>  3.6   |  25  |  0.74    Ca    8.3<L>      16 Oct 2019 07:16    TPro  7.5  /  Alb  3.0<L>  /  TBili  0.7  /  DBili  x   /  AST  360<H>  /  ALT  585<H>  /  AlkPhos  142<H>  10-16

## 2019-10-16 NOTE — CONSULT NOTE ADULT - ASSESSMENT
39 year old F with PMH of DM came to the ED with fever and RUQ abdominal pain that started 6 days ago. Pt states that she went for a family visit to [place] the last week when she started spiking fever of 101-102 F everyday. 2 days later patient started feeling RUQ abdominal pain that is dull, progressively increasing, non radiating, aggravated by movement like laughing, walking and coughing; ibuprofen did not help relieve the pain. The pain is not related to food intake though the symptoms started after feast during the festival which did consist of fatty food. Pt went to ED in [place] and got US Abdomen done which showed no GB stones but some pericholecystic fluid and LFTs were elevate. Pt wanted to get further workup done in [place]. Pt had 1 episode of vomiting on Monday. Pt denies nausea, changes is color of stool or urine, diarrhea/constipation, nausea, changes in urinary habits, myalgia, joint pains or rash. Labs/Medications

## 2019-10-16 NOTE — PROGRESS NOTE ADULT - ASSESSMENT
1. RUQ abdominal pain  2. The etiology for Elevated LFT's is not clear  3. Dilated CBD (possibly passed stone)  4. ? cholecystitis      Suggestions:    1. Follow up LFT's  3. Antibiotics as per ID  4. Surgical evaluation  5. HIDA Scan  6. Avoid NSAID  7. GI and DVT prophylaxis

## 2019-10-16 NOTE — PROGRESS NOTE ADULT - SUBJECTIVE AND OBJECTIVE BOX
Patient is a 39y old  Female who presents with a chief complaint of Fever and RUQ abdominal pain X 6 days (15 Oct 2019 22:56)    pt seen in icu [  ], reg med floor [ x  ], bed [  x], chair at bedside [   ], a+o x3 [x  ], lethargic [  ],  nad [ x ]      Allergies    No Known Allergies        Vitals    T(F): 99.2 (10-16-19 @ 05:44), Max: 99.2 (10-16-19 @ 05:44)  HR: 77 (10-16-19 @ 05:44) (72 - 78)  BP: 99/65 (10-16-19 @ 05:44) (99/65 - 112/79)  RR: 17 (10-16-19 @ 05:44) (16 - 17)  SpO2: 96% (10-16-19 @ 05:44) (96% - 99%)  Wt(kg): --  CAPILLARY BLOOD GLUCOSE      POCT Blood Glucose.: 121 mg/dL (16 Oct 2019 08:00)      Labs                          11.6   8.77  )-----------( 266      ( 15 Oct 2019 08:19 )             35.9       10-16    134<L>  |  103  |  6<L>  ----------------------------<  131<H>  3.6   |  25  |  0.74    Ca    8.3<L>      16 Oct 2019 07:16    TPro  7.5  /  Alb  3.0<L>  /  TBili  0.7  /  DBili  x   /  AST  360<H>  /  ALT  585<H>  /  AlkPhos  142<H>  10-16            .Urine  10-13 @ 21:58   <10,000 CFU/mL Normal Urogenital Daysi  --  --      .Blood  10-13 @ 14:30   No growth to date.  --  --          Radiology Results    < from: CT Abdomen and Pelvis w/ IV Cont (10.15.19 @ 11:12) >  Impression: Small pulmonary consolidation in the medial right middle lobe   with air bronchogram may represent pneumonia. Follow-up chest CT in 4-6   weeks is recommended to ensure resolution.    Hepatic steatosis and hepatomegaly again noted.    Mild undulating contour of the liver may represent cirrhosis. Clinical   correlation is recommended. Splenomegaly and mild ascites.    Mild gallbladder wall thickening versus pericholecystic fluid. No CT   evidence for gallstone. If there is a clinical suspicion for acute   cholecystitis, HIDA scan may be pursued for further evaluation.     Mild common bile duct dilatation.    Mild stranding adjacent to the pancreatic head and duodenum may represent   acute pancreatitis or nonspecific duodenitis. Clinical correlation with   pancreatic enzymes is recommended. No evidence for pancreatic necrosis.    Mildly enlarged 1.4 cm peripancreatic lymph node.      < end of copied text >        Meds    MEDICATIONS  (STANDING):  dextrose 5%. 1000 milliLiter(s) (50 mL/Hr) IV Continuous <Continuous>  dextrose 50% Injectable 12.5 Gram(s) IV Push once  dextrose 50% Injectable 25 Gram(s) IV Push once  dextrose 50% Injectable 25 Gram(s) IV Push once  insulin lispro (HumaLOG) corrective regimen sliding scale   SubCutaneous three times a day before meals  insulin lispro (HumaLOG) corrective regimen sliding scale   SubCutaneous at bedtime  melatonin 3 milliGRAM(s) Oral at bedtime  pantoprazole    Tablet 40 milliGRAM(s) Oral before breakfast  piperacillin/tazobactam IVPB.. 3.375 Gram(s) IV Intermittent every 8 hours  sodium chloride 0.9%. 1000 milliLiter(s) (50 mL/Hr) IV Continuous <Continuous>      MEDICATIONS  (PRN):  dextrose 40% Gel 15 Gram(s) Oral once PRN Blood Glucose LESS THAN 70 milliGRAM(s)/deciliter  glucagon  Injectable 1 milliGRAM(s) IntraMuscular once PRN Glucose LESS THAN 70 milligrams/deciliter  ibuprofen  Tablet. 200 milliGRAM(s) Oral three times a day PRN Mild Pain (1 - 3)      Physical Exam      Neuro :  no focal deficits  Respiratory: CTA B/L  CV: RRR, S1S2, no murmurs,   Abdominal: Soft, ND +BS, ruq moderate tender to deep palp  Extremities: No edema, + peripheral pulses      ASSESSMENT    sepsis   r/o cholangitis  possible acalculous cholecystitis  right lower lobe pna  ? pancreatitis vs duodenitis  splenomegally  transaminitis  h/o DM (diabetes mellitus)        PLAN        id f/u   blood cx : no growth   Continue zosyn inpatient and May change to oral Levaquin and Flagyl on discharge to complete the 10 days course  mrcp result noted noted  ct abd pelvis with contrast with medial  right middle lobe air bronchogram may represent pneumonia. Hepatic steatosis and hepatomegaly again noted.  Mild undulating contour of the liver may represent cirrhosis. Splenomegaly and mild ascites. Mild gallbladder wall thickening versus pericholecystic fluid. No CT   evidence for gallstone. Mild common bile duct dilatation. Mild stranding adjacent to the pancreatic head and duodenum may represent   acute pancreatitis or nonspecific duodenitis. Mildly enlarged 1.4 cm peripancreatic lymph node noted above   f/u hida scan  gi f/u   f/u ca 19-9,   f/u lipase  f/u infectious scrn  hepatitis panel : negative   pt tolerating clears  npo at midnight for hida in am  hss  hgba1c : 7.4  cont current meds

## 2019-10-17 ENCOUNTER — TRANSCRIPTION ENCOUNTER (OUTPATIENT)
Age: 39
End: 2019-10-17

## 2019-10-17 VITALS
HEART RATE: 74 BPM | SYSTOLIC BLOOD PRESSURE: 112 MMHG | OXYGEN SATURATION: 100 % | TEMPERATURE: 98 F | RESPIRATION RATE: 16 BRPM | DIASTOLIC BLOOD PRESSURE: 71 MMHG

## 2019-10-17 DIAGNOSIS — Z02.9 ENCOUNTER FOR ADMINISTRATIVE EXAMINATIONS, UNSPECIFIED: ICD-10-CM

## 2019-10-17 LAB
ALBUMIN SERPL ELPH-MCNC: 3 G/DL — LOW (ref 3.5–5)
ALP SERPL-CCNC: 147 U/L — HIGH (ref 40–120)
ALT FLD-CCNC: 610 U/L DA — HIGH (ref 10–60)
ANION GAP SERPL CALC-SCNC: 7 MMOL/L — SIGNIFICANT CHANGE UP (ref 5–17)
AST SERPL-CCNC: 442 U/L — HIGH (ref 10–40)
BILIRUB SERPL-MCNC: 0.6 MG/DL — SIGNIFICANT CHANGE UP (ref 0.2–1.2)
BUN SERPL-MCNC: 8 MG/DL — SIGNIFICANT CHANGE UP (ref 7–18)
CALCIUM SERPL-MCNC: 8.4 MG/DL — SIGNIFICANT CHANGE UP (ref 8.4–10.5)
CHLORIDE SERPL-SCNC: 108 MMOL/L — SIGNIFICANT CHANGE UP (ref 96–108)
CO2 SERPL-SCNC: 23 MMOL/L — SIGNIFICANT CHANGE UP (ref 22–31)
CREAT SERPL-MCNC: 0.67 MG/DL — SIGNIFICANT CHANGE UP (ref 0.5–1.3)
GLUCOSE BLDC GLUCOMTR-MCNC: 131 MG/DL — HIGH (ref 70–99)
GLUCOSE BLDC GLUCOMTR-MCNC: 144 MG/DL — HIGH (ref 70–99)
GLUCOSE BLDC GLUCOMTR-MCNC: 167 MG/DL — HIGH (ref 70–99)
GLUCOSE SERPL-MCNC: 143 MG/DL — HIGH (ref 70–99)
HCT VFR BLD CALC: 37.5 % — SIGNIFICANT CHANGE UP (ref 34.5–45)
HGB BLD-MCNC: 12.1 G/DL — SIGNIFICANT CHANGE UP (ref 11.5–15.5)
MCHC RBC-ENTMCNC: 28.1 PG — SIGNIFICANT CHANGE UP (ref 27–34)
MCHC RBC-ENTMCNC: 32.3 GM/DL — SIGNIFICANT CHANGE UP (ref 32–36)
MCV RBC AUTO: 87 FL — SIGNIFICANT CHANGE UP (ref 80–100)
NRBC # BLD: 0 /100 WBCS — SIGNIFICANT CHANGE UP (ref 0–0)
PLATELET # BLD AUTO: 281 K/UL — SIGNIFICANT CHANGE UP (ref 150–400)
POTASSIUM SERPL-MCNC: 3.8 MMOL/L — SIGNIFICANT CHANGE UP (ref 3.5–5.3)
POTASSIUM SERPL-SCNC: 3.8 MMOL/L — SIGNIFICANT CHANGE UP (ref 3.5–5.3)
PROT SERPL-MCNC: 7.6 G/DL — SIGNIFICANT CHANGE UP (ref 6–8.3)
RBC # BLD: 4.31 M/UL — SIGNIFICANT CHANGE UP (ref 3.8–5.2)
RBC # FLD: 12 % — SIGNIFICANT CHANGE UP (ref 10.3–14.5)
SODIUM SERPL-SCNC: 138 MMOL/L — SIGNIFICANT CHANGE UP (ref 135–145)
WBC # BLD: 8.43 K/UL — SIGNIFICANT CHANGE UP (ref 3.8–10.5)
WBC # FLD AUTO: 8.43 K/UL — SIGNIFICANT CHANGE UP (ref 3.8–10.5)

## 2019-10-17 PROCEDURE — 83036 HEMOGLOBIN GLYCOSYLATED A1C: CPT

## 2019-10-17 PROCEDURE — 87040 BLOOD CULTURE FOR BACTERIA: CPT

## 2019-10-17 PROCEDURE — 74183 MRI ABD W/O CNTR FLWD CNTR: CPT

## 2019-10-17 PROCEDURE — 82306 VITAMIN D 25 HYDROXY: CPT

## 2019-10-17 PROCEDURE — 84443 ASSAY THYROID STIM HORMONE: CPT

## 2019-10-17 PROCEDURE — 78226 HEPATOBILIARY SYSTEM IMAGING: CPT | Mod: 26

## 2019-10-17 PROCEDURE — 96361 HYDRATE IV INFUSION ADD-ON: CPT

## 2019-10-17 PROCEDURE — 99238 HOSP IP/OBS DSCHRG MGMT 30/<: CPT

## 2019-10-17 PROCEDURE — 96374 THER/PROPH/DIAG INJ IV PUSH: CPT

## 2019-10-17 PROCEDURE — 87086 URINE CULTURE/COLONY COUNT: CPT

## 2019-10-17 PROCEDURE — 99231 SBSQ HOSP IP/OBS SF/LOW 25: CPT

## 2019-10-17 PROCEDURE — 85730 THROMBOPLASTIN TIME PARTIAL: CPT

## 2019-10-17 PROCEDURE — 85610 PROTHROMBIN TIME: CPT

## 2019-10-17 PROCEDURE — 71045 X-RAY EXAM CHEST 1 VIEW: CPT

## 2019-10-17 PROCEDURE — 85027 COMPLETE CBC AUTOMATED: CPT

## 2019-10-17 PROCEDURE — 86301 IMMUNOASSAY TUMOR CA 19-9: CPT

## 2019-10-17 PROCEDURE — 81003 URINALYSIS AUTO W/O SCOPE: CPT

## 2019-10-17 PROCEDURE — 84702 CHORIONIC GONADOTROPIN TEST: CPT

## 2019-10-17 PROCEDURE — 36415 COLL VENOUS BLD VENIPUNCTURE: CPT

## 2019-10-17 PROCEDURE — 71250 CT THORAX DX C-: CPT

## 2019-10-17 PROCEDURE — 80053 COMPREHEN METABOLIC PANEL: CPT

## 2019-10-17 PROCEDURE — 86308 HETEROPHILE ANTIBODY SCREEN: CPT

## 2019-10-17 PROCEDURE — 82150 ASSAY OF AMYLASE: CPT

## 2019-10-17 PROCEDURE — 74177 CT ABD & PELVIS W/CONTRAST: CPT

## 2019-10-17 PROCEDURE — 82746 ASSAY OF FOLIC ACID SERUM: CPT

## 2019-10-17 PROCEDURE — 76705 ECHO EXAM OF ABDOMEN: CPT

## 2019-10-17 PROCEDURE — 82607 VITAMIN B-12: CPT

## 2019-10-17 PROCEDURE — 99285 EMERGENCY DEPT VISIT HI MDM: CPT | Mod: 25

## 2019-10-17 PROCEDURE — 78226 HEPATOBILIARY SYSTEM IMAGING: CPT

## 2019-10-17 PROCEDURE — 83605 ASSAY OF LACTIC ACID: CPT

## 2019-10-17 PROCEDURE — 93005 ELECTROCARDIOGRAM TRACING: CPT

## 2019-10-17 PROCEDURE — A9537: CPT

## 2019-10-17 PROCEDURE — 83690 ASSAY OF LIPASE: CPT

## 2019-10-17 PROCEDURE — 82962 GLUCOSE BLOOD TEST: CPT

## 2019-10-17 PROCEDURE — 80074 ACUTE HEPATITIS PANEL: CPT

## 2019-10-17 RX ORDER — CIPROFLOXACIN LACTATE 400MG/40ML
1 VIAL (ML) INTRAVENOUS
Qty: 6 | Refills: 0
Start: 2019-10-17 | End: 2019-10-22

## 2019-10-17 RX ORDER — METFORMIN HYDROCHLORIDE 850 MG/1
1 TABLET ORAL
Qty: 0 | Refills: 0 | DISCHARGE

## 2019-10-17 RX ORDER — IBUPROFEN 200 MG
400 TABLET ORAL ONCE
Refills: 0 | Status: COMPLETED | OUTPATIENT
Start: 2019-10-17 | End: 2019-10-17

## 2019-10-17 RX ORDER — METRONIDAZOLE 500 MG
1 TABLET ORAL
Qty: 18 | Refills: 0
Start: 2019-10-17 | End: 2019-10-22

## 2019-10-17 RX ORDER — SITAGLIPTIN 50 MG/1
1 TABLET, FILM COATED ORAL
Qty: 30 | Refills: 0
Start: 2019-10-17 | End: 2019-11-15

## 2019-10-17 RX ADMIN — PIPERACILLIN AND TAZOBACTAM 25 GRAM(S): 4; .5 INJECTION, POWDER, LYOPHILIZED, FOR SOLUTION INTRAVENOUS at 08:45

## 2019-10-17 RX ADMIN — PIPERACILLIN AND TAZOBACTAM 25 GRAM(S): 4; .5 INJECTION, POWDER, LYOPHILIZED, FOR SOLUTION INTRAVENOUS at 15:25

## 2019-10-17 RX ADMIN — Medication 400 MILLIGRAM(S): at 15:24

## 2019-10-17 RX ADMIN — Medication 200 MILLIGRAM(S): at 05:29

## 2019-10-17 RX ADMIN — Medication 400 MILLIGRAM(S): at 16:44

## 2019-10-17 RX ADMIN — Medication 1: at 13:36

## 2019-10-17 RX ADMIN — Medication 200 MILLIGRAM(S): at 05:59

## 2019-10-17 NOTE — PROGRESS NOTE ADULT - NEGATIVE ENMT SYMPTOMS
no nose bleeds/no dry mouth/no throat pain/no gum bleeding/no dysphagia/no ear pain/no hearing difficulty
no throat pain/no ear pain/no nose bleeds/no hearing difficulty/no dry mouth/no dysphagia/no gum bleeding
no gum bleeding/no dry mouth/no dysphagia/no hearing difficulty/no ear pain/no nose bleeds/no throat pain
no hearing difficulty/no ear pain/no gum bleeding/no throat pain/no dysphagia/no nose bleeds/no dry mouth

## 2019-10-17 NOTE — PROGRESS NOTE ADULT - NEGATIVE OPHTHALMOLOGIC SYMPTOMS
no diplopia/no photophobia
no photophobia/no diplopia
no diplopia/no photophobia
no photophobia/no diplopia

## 2019-10-17 NOTE — PROGRESS NOTE ADULT - NEGATIVE SKIN SYMPTOMS
no itching/no rash/no dryness
no itching/no rash/no dryness
no rash/no itching/no dryness
no rash/no itching/no dryness

## 2019-10-17 NOTE — PROGRESS NOTE ADULT - PROBLEM SELECTOR PLAN 1
-p/w RLQ pain and elevated LFT's   -US abdomen shows No cholelithiasis. Dilated common bile duct.  -MRCP shows Small volume upper abdominal ascites, of uncertain etiology. Dilated common bile duct, measuring 0.9 cm. No cholelithiasis or choledocholithiasis.  -CTA with no evidence of acute cholecystitis   -remains afebrile, WBC WNL  -cont Zosyn day #4 inpt, can change to oral Levaquin and Flagyl on discharge to complete 10 day course   -f/u cultures- NTD   -f/u HIDA scan today   -ID Dr. Whittington   -GI  Dr. Cash

## 2019-10-17 NOTE — PROGRESS NOTE ADULT - NEGATIVE GENERAL GENITOURINARY SYMPTOMS
no dysuria/no renal colic/no incontinence/no hematuria
no hematuria/no incontinence/no renal colic/no dysuria
no incontinence/no dysuria/no renal colic/no hematuria
no hematuria/no incontinence/no renal colic/no dysuria

## 2019-10-17 NOTE — PROGRESS NOTE ADULT - SUBJECTIVE AND OBJECTIVE BOX
HPI: 39 year old F with PMH of DM , and recent travel to [place], who presented to ED with fever and RUQ abdominal pain x 6 days, and pericholecystic fluid with elevated LFT's per workup in [place]. In ED, US abdomen shows dilated CBD with no stone , and labs significant for transaminitis. Pt was started on Zosyn for cholangitis vs acute hepatitis. Acute Hepatitis panel non-reactive. MRCP shows Small volume upper abdominal ascites, of uncertain etiology. Dilated common bile duct, measuring 0.9 cm. No cholelithiasis or choledocholithiasis.  CTA of abdomen with no evidence for gallstone and can not rule out acute cholecystitis   NPO for HIDA scan today     OVERNIGHT EVENTS: no acute  events overnight, NPO for HIDA scan today     REVIEW OF SYSTEMS:  CONSTITUTIONAL: No fever, chills  NECK: No pain or stiffness  RESPIRATORY: No cough, SOB  CARDIOVASCULAR: No chest pain, palpitations  GASTROINTESTINAL: No abdominal pain. No nausea, vomiting, or diarrhea  GENITOURINARY: No dysuria  NEUROLOGICAL: No HA    T(C): 36.2 (10-17-19 @ 05:27), Max: 37.4 (10-16-19 @ 13:45)  HR: 74 (10-17-19 @ 05:27) (67 - 82)  BP: 106/72 (10-17-19 @ 05:27) (102/65 - 123/77)  RR: 16 (10-17-19 @ 05:27) (16 - 16)  SpO2: 97% (10-17-19 @ 05:27) (97% - 100%)  Wt(kg): --Vital Signs Last 24 Hrs  T(C): 36.2 (17 Oct 2019 05:27), Max: 37.4 (16 Oct 2019 13:45)  T(F): 97.2 (17 Oct 2019 05:27), Max: 99.3 (16 Oct 2019 13:45)  HR: 74 (17 Oct 2019 05:27) (67 - 82)  BP: 106/72 (17 Oct 2019 05:27) (102/65 - 123/77)  BP(mean): --  RR: 16 (17 Oct 2019 05:27) (16 - 16)  SpO2: 97% (17 Oct 2019 05:27) (97% - 100%)    MEDICATIONS  (STANDING):  dextrose 5%. 1000 milliLiter(s) (50 mL/Hr) IV Continuous <Continuous>  dextrose 50% Injectable 12.5 Gram(s) IV Push once  dextrose 50% Injectable 25 Gram(s) IV Push once  dextrose 50% Injectable 25 Gram(s) IV Push once  insulin lispro (HumaLOG) corrective regimen sliding scale   SubCutaneous three times a day before meals  insulin lispro (HumaLOG) corrective regimen sliding scale   SubCutaneous at bedtime  melatonin 3 milliGRAM(s) Oral at bedtime  pantoprazole    Tablet 40 milliGRAM(s) Oral before breakfast  piperacillin/tazobactam IVPB.. 3.375 Gram(s) IV Intermittent every 8 hours  sodium chloride 0.9%. 1000 milliLiter(s) (50 mL/Hr) IV Continuous <Continuous>    MEDICATIONS  (PRN):  dextrose 40% Gel 15 Gram(s) Oral once PRN Blood Glucose LESS THAN 70 milliGRAM(s)/deciliter  glucagon  Injectable 1 milliGRAM(s) IntraMuscular once PRN Glucose LESS THAN 70 milligrams/deciliter  ibuprofen  Tablet. 200 milliGRAM(s) Oral three times a day PRN Mild Pain (1 - 3)      PHYSICAL EXAM:  GENERAL: NAD  ENMT: Moist mucous membranes  NECK: Supple, No JVD  CHEST/LUNG: Clear to auscultation bilaterally; No rales, rhonchi, wheezing, or rubs  HEART: S1, S2, Regular rate and rhythm  ABDOMEN: Soft, Nontender, Nondistended; Bowel sounds present  NEURO: Alert & Oriented X3  EXTREMITIES: No LE edema, no calf tenderness    Consultant(s) Notes Reviewed:  [x ] YES  [ ] NO  Care Discussed with Consultants/Other Providers [ x] YES  [ ] NO    LABS:                        12.1   8.43  )-----------( 281      ( 17 Oct 2019 07:35 )             37.5     10-17    138  |  108  |  8   ----------------------------<  143<H>  3.8   |  23  |  0.67    Ca    8.4      17 Oct 2019 07:35    TPro  7.6  /  Alb  3.0<L>  /  TBili  0.6  /  DBili  x   /  AST  442<H>  /  ALT  610<H>  /  AlkPhos  147<H>  10-17        CAPILLARY BLOOD GLUCOSE      POCT Blood Glucose.: 131 mg/dL (17 Oct 2019 08:09)  POCT Blood Glucose.: 172 mg/dL (16 Oct 2019 21:30)  POCT Blood Glucose.: 167 mg/dL (16 Oct 2019 16:30)  POCT Blood Glucose.: 165 mg/dL (16 Oct 2019 11:37)    RADIOLOGY & ADDITIONAL TESTS:    < from: CT Chest No Cont (10.16.19 @ 12:50) >    EXAM:  CT CHEST                            PROCEDURE DATE:  10/16/2019          INTERPRETATION:  CLINICAL INDICATION: 39 years  Female with evaluate for   PNA. Cholangitis    COMPARISON: None.    PROCEDURE:   CT of the Chest was performed without intravenous contrast.  Sagittal and coronal reformats were performed.      FINDINGS:    LUNGS AND AIRWAYS: Patent central airways.  Bilateral lower lobe and   right middle lobe discoid atelectasis. No focal consolidation or dominant   mass.    PLEURA: No pleural effusion.    MEDIASTINUM AND CARISSA: No lymphadenopathy.    VESSELS: Within normal limits.    HEART: Mild cardiomegaly. No pericardial effusion.    CHEST WALL AND LOWER NECK: Within normal limits.    VISUALIZED UPPER ABDOMEN: Enlarged liver. The left lobe extends across   the midline draping over the spleen. Hepatic steatosis.    BONES: Within normal limits.    IMPRESSION:     Bilateral lower lobe and right middle lobe discoid atelectasis. Mild   cardiomegaly.    Hepatomegaly and hepatic steatosis.      < end of copied text >    < from: CT Abdomen and Pelvis w/ IV Cont (10.15.19 @ 11:12) >    EXAM:  CT ABDOMEN AND PELVIS IC                            PROCEDURE DATE:  10/15/2019          INTERPRETATION:  CT of the abdomen and pelvis with IV contrast    Clinical Indication: Right upper quadrant abdominal pain, dilated common   bile duct and periportal edema    Technique: Axial multidetector CT images of the abdomen and pelvis are   acquired following the administration of oral and IV contrast (90 cc   Omnipaque-350 administered, 10 cc discarded).    Comparison: 3/25/2018.    Findings:Limited sections through the lung bases demonstrate trace left   pleural effusion. Small bilateral atelectasis. Small consolidation in the   medial right middle lobe with air bronchogram.    Hepatic steatosis and hepatomegaly. Small calcifications inthe liver,   likely due to prior granulomatous disease. Mild undulating contour of the   liver may represent cirrhosis. Clinical correlation is recommended.    Mild gallbladder wall thickening versus pericholecystic fluid. No CT   evidence for gallstone. The common bile duct is dilated at 8 mm in   caliber.     Mild stranding adjacent to the pancreatic head and duodenum may represent   acute pancreatitis or nonspecific duodenitis. No evidence for pancreatic   necrosis.    Splenomegaly at 14.7 cm.    The adrenals and kidneys appear unremarkable. No evidence for a ureteral   calculus. No hydronephrosis.    The appendix maintains normal caliber. No bowel obstruction or grossly   thickened bowel wall.    Mild ascites in the abdomen and pelvis.    No evidence for free air. Mildly enlarged 1.4 cm peripancreatic lymph   node (image 54 series 2).    The urinary appears unremarkable. Small enhancing lesion in the   myometrium, likely representing a fibroid.    Impression: Small pulmonary consolidation in the medial right middle lobe   with air bronchogram may represent pneumonia. Follow-up chest CT in 4-6   weeks is recommended to ensure resolution.    Hepatic steatosis and hepatomegaly again noted.    Mild undulating contour of the liver may represent cirrhosis. Clinical   correlation is recommended. Splenomegaly and mild ascites.    Mild gallbladder wall thickening versus pericholecystic fluid. No CT   evidence for gallstone. If there is a clinical suspicion for acute   cholecystitis, HIDA scan may be pursued for further evaluation.     Mild common bile duct dilatation.    Mild stranding adjacent to the pancreatic head and duodenum may represent   acute pancreatitis or nonspecific duodenitis. Clinical correlation with   pancreatic enzymes is recommended. No evidence for pancreatic necrosis.    Mildly enlarged 1.4 cm peripancreatic lymph node.      < end of copied text >      Imaging Personally Reviewed:  [ ] YES  [ ] NO

## 2019-10-17 NOTE — PROGRESS NOTE ADULT - NEGATIVE GENERAL SYMPTOMS
no fever/no chills/no sweating/no anorexia/no weight loss
no weight loss/no fever/no chills/no sweating/no anorexia
no fever/no chills/no sweating/no anorexia/no weight loss
no chills/no sweating/no anorexia/no weight loss/no fever

## 2019-10-17 NOTE — CHART NOTE - NSCHARTNOTEFT_GEN_A_CORE
-HIDA scan negative with no evidence of acute cholecystitis. no further reports of abd pain, tolerating PO diet   -d/w Dr. García and GI Dr. Cash, no further inpt interventions recommended   -pt to be discharged home with outpt follow up with hepatologist  -apt made with Dr Noble on Monday 10/21 at 1030, plan of care and importance of close follow up discussed with pt and spouse with good understanding

## 2019-10-17 NOTE — PROGRESS NOTE ADULT - NEGATIVE CARDIOVASCULAR SYMPTOMS
no palpitations/no orthopnea/no chest pain
no palpitations/no orthopnea/no chest pain
no palpitations/no chest pain/no orthopnea
no palpitations/no chest pain/no orthopnea

## 2019-10-17 NOTE — PROGRESS NOTE ADULT - ASSESSMENT
1. RUQ abdominal pain  2. The etiology for Elevated LFT's is not clear  3. Dilated CBD (possibly passed stone)  4. HIDA Scan (No cholecystitis)      Suggestions:    1. Follow up LFT's  3. Antibiotics as per ID  4. Hepatology evaluation  5. Check ALEX and Antismooth muscle antibody  6. Consider Liver biopsy if elevated LFT's persist or increase  7. GI and DVT prophylaxis

## 2019-10-17 NOTE — PROGRESS NOTE ADULT - NEGATIVE MUSCULOSKELETAL SYMPTOMS
no myalgia/no muscle cramps/no stiffness/no muscle weakness/no neck pain
no muscle weakness/no myalgia/no stiffness/no neck pain/no muscle cramps
no myalgia/no stiffness/no neck pain/no muscle cramps/no muscle weakness
no muscle weakness/no stiffness/no myalgia/no muscle cramps/no neck pain

## 2019-10-17 NOTE — PROGRESS NOTE ADULT - PROBLEM SELECTOR PLAN 1
Check pending culture  Antibiotics  ID follow up. Blood culture negative  Antibiotics  ID follow up.

## 2019-10-17 NOTE — PROGRESS NOTE ADULT - NEGATIVE PSYCHIATRIC SYMPTOMS
no depression/no insomnia/no anxiety/no agitation/no suicidal ideation
no depression/no anxiety/no agitation/no insomnia/no suicidal ideation
no depression/no insomnia/no agitation/no anxiety/no suicidal ideation
no agitation/no insomnia/no anxiety/no suicidal ideation/no depression

## 2019-10-17 NOTE — PROGRESS NOTE ADULT - SUBJECTIVE AND OBJECTIVE BOX
[   ] ICU                                          [   ] CCU                                      [  x ] Medical Floor      Patient is comfortable. No new complaints reported, No abdominal pain, N/V, hematemesis, hematochezia, melena, fever, chills, chest pain, SOB, cough or diarrhea reported.    VITALS  Vital Signs Last 24 Hrs  T(C): 36.9 (17 Oct 2019 13:44), Max: 36.9 (17 Oct 2019 13:44)  T(F): 98.5 (17 Oct 2019 13:44), Max: 98.5 (17 Oct 2019 13:44)  HR: 74 (17 Oct 2019 13:44) (67 - 74)  BP: 112/71 (17 Oct 2019 13:44) (102/65 - 112/71)   RR: 16 (17 Oct 2019 13:44) (16 - 16)  SpO2: 100% (17 Oct 2019 13:44) (97% - 100%)       MEDICATIONS  (STANDING):  dextrose 5%. 1000 milliLiter(s) (50 mL/Hr) IV Continuous <Continuous>  dextrose 50% Injectable 12.5 Gram(s) IV Push once  dextrose 50% Injectable 25 Gram(s) IV Push once  dextrose 50% Injectable 25 Gram(s) IV Push once  insulin lispro (HumaLOG) corrective regimen sliding scale   SubCutaneous three times a day before meals  insulin lispro (HumaLOG) corrective regimen sliding scale   SubCutaneous at bedtime  melatonin 3 milliGRAM(s) Oral at bedtime  pantoprazole    Tablet 40 milliGRAM(s) Oral before breakfast  piperacillin/tazobactam IVPB.. 3.375 Gram(s) IV Intermittent every 8 hours  sodium chloride 0.9%. 1000 milliLiter(s) (50 mL/Hr) IV Continuous <Continuous>    MEDICATIONS  (PRN):  dextrose 40% Gel 15 Gram(s) Oral once PRN Blood Glucose LESS THAN 70 milliGRAM(s)/deciliter  glucagon  Injectable 1 milliGRAM(s) IntraMuscular once PRN Glucose LESS THAN 70 milligrams/deciliter  ibuprofen  Tablet. 200 milliGRAM(s) Oral three times a day PRN Mild Pain (1 - 3)                            12.1   8.43  )-----------( 281      ( 17 Oct 2019 07:35 )             37.5       10-17    138  |  108  |  8   ----------------------------<  143<H>  3.8   |  23  |  0.67    Ca    8.4      17 Oct 2019 07:35    TPro  7.6  /  Alb  3.0<L>  /  TBili  0.6  /  DBili  x   /  AST  442<H>  /  ALT  610<H>  /  AlkPhos  147<H>  10-17      EXAM:  NM HEPATOBILIARY IMG                            PROCEDURE DATE:  10/17/2019          INTERPRETATION:    RADIOPHARMACEUTICAL:  99mTc-Mebrofenin  DOSE: 3.1 mCi IV    CLINICAL INFORMATION: 39 year old female with right upper quadrant   abdominal pain and gallbladder wall thickening, referred to evaluate for   acute cholecystitis    TECHNIQUE: Dynamic images of the anterior abdomen were obtained for 1   hour immediately following radiotracer injection. Static images of the   abdomen in the anterior, right anterior oblique and right lateral   projections were also obtained.    COMPARISON: No previous hepatobiliary scan for comparison    FINDINGS: There is prompt uptake of the injected radiotracer by the   hepatocytes. The gallbladder is first visualized at 25 minutes post   tracer injection and bowel activity by 50 minutes. There is normal tracer   clearance from the liver by the end of the study.     IMPRESSION: Normal hepatobiliary scan.     No scan evidence of acute cholecystitis.

## 2019-10-17 NOTE — DISCHARGE NOTE NURSING/CASE MANAGEMENT/SOCIAL WORK - PATIENT PORTAL LINK FT
You can access the FollowMyHealth Patient Portal offered by [facility] by registering at the following website: [email or web address]. By joining [facility]’s FollowMyHealth portal, you will also be able to view your health information using other applications (apps) compatible with our system.

## 2019-10-17 NOTE — PROGRESS NOTE ADULT - NEGATIVE RESPIRATORY AND THORAX SYMPTOMS
no dyspnea/no cough/no hemoptysis/no wheezing
no wheezing/no cough/no hemoptysis/no dyspnea
no dyspnea/no cough/no wheezing/no hemoptysis
no cough/no wheezing/no dyspnea/no hemoptysis

## 2019-10-17 NOTE — PROGRESS NOTE ADULT - SUBJECTIVE AND OBJECTIVE BOX
Patient is seen and examined at the bed side, is afebrile. The LFTS are trending up.        REVIEW OF SYSTEMS: All other review systems are negative        ALLERGIES: No Known Allergies      Vital Signs Last 24 Hrs  T(C): 36.9 (17 Oct 2019 13:44), Max: 36.9 (17 Oct 2019 13:44)  T(F): 98.5 (17 Oct 2019 13:44), Max: 98.5 (17 Oct 2019 13:44)  HR: 74 (17 Oct 2019 13:44) (67 - 74)  BP: 112/71 (17 Oct 2019 13:44) (102/65 - 112/71)  BP(mean): --  RR: 16 (17 Oct 2019 13:44) (16 - 16)  SpO2: 100% (17 Oct 2019 13:44) (97% - 100%)      PHYSICAL EXAM:  GENERAL: Not in distress   CHEST/LUNG:  Air entry bilaterally  HEART: s1 and s2 present  ABDOMEN: RUQ tenderness resolving  EXTREMITIES: No pedal  edema  CNS: Awake and Alert        LABS:                         12.1   8.43  )-----------( 281      ( 17 Oct 2019 07:35 )             37.5                           11.6   8.77  )-----------( 266      ( 15 Oct 2019 08:19 )             35.9       10    138  |  108  |  8   ----------------------------<  143<H>  3.8   |  23  |  0.67    Ca    8.4      17 Oct 2019 07:35    TPro  7.6  /  Alb  3.0<L>  /  TBili  0.6  /  DBili  x   /  AST  442<H>  /  ALT  610<H>  /  AlkPhos  147<H>  10-17    10-16    134<L>  |  103  |  6<L>  ----------------------------<  131<H>  3.6   |  25  |  0.74    Ca    8.3<L>      16 Oct 2019 07:16    TPro  7.5  /  Alb  3.0<L>  /  TBili  0.7  /  DBili  x   /  AST  360<H>  /  ALT  585<H>  /  AlkPhos  142<H>  10-16    10-15    137  |  105  |  8   ----------------------------<  133<H>  3.4<L>   |  24  |  0.61    Ca    8.2<L>      15 Oct 2019 08:19    TPro  6.8  /  Alb  2.8<L>  /  TBili  0.6  /  DBili  x   /  AST  331<H>  /  ALT  514<H>  /  AlkPhos  129<H>  10-15          CAPILLARY BLOOD GLUCOSE  POCT Blood Glucose.: 104 mg/dL (14 Oct 2019 18:13)  POCT Blood Glucose.: 99 mg/dL (14 Oct 2019 11:44)  POCT Blood Glucose.: 110 mg/dL (14 Oct 2019 05:59)  POCT Blood Glucose.: 118 mg/dL (14 Oct 2019 00:16)        Urinalysis Basic - ( 13 Oct 2019 13:05 )  Color: Yellow / Appearance: Clear / S.010 / pH: x  Gluc: x / Ketone: Negative  / Bili: Negative / Urobili: Negative   Blood: x / Protein: Negative / Nitrite: Negative   Leuk Esterase: Negative / RBC: x / WBC x   Sq Epi: x / Non Sq Epi: x / Bacteria: x        MEDICATIONS  (STANDING):  dextrose 5%. 1000 milliLiter(s) (50 mL/Hr) IV Continuous <Continuous>  dextrose 50% Injectable 12.5 Gram(s) IV Push once  dextrose 50% Injectable 25 Gram(s) IV Push once  dextrose 50% Injectable 25 Gram(s) IV Push once  insulin lispro (HumaLOG) corrective regimen sliding scale   SubCutaneous three times a day before meals  insulin lispro (HumaLOG) corrective regimen sliding scale   SubCutaneous at bedtime  melatonin 3 milliGRAM(s) Oral at bedtime  pantoprazole    Tablet 40 milliGRAM(s) Oral before breakfast  piperacillin/tazobactam IVPB.. 3.375 Gram(s) IV Intermittent every 8 hours  sodium chloride 0.9%. 1000 milliLiter(s) (50 mL/Hr) IV Continuous <Continuous>    MEDICATIONS  (PRN):  dextrose 40% Gel 15 Gram(s) Oral once PRN Blood Glucose LESS THAN 70 milliGRAM(s)/deciliter  glucagon  Injectable 1 milliGRAM(s) IntraMuscular once PRN Glucose LESS THAN 70 milligrams/deciliter  ibuprofen  Tablet. 200 milliGRAM(s) Oral three times a day PRN Mild Pain (1 - 3)        RADIOLOGY & ADDITIONAL TESTS:    < from: CT Chest No Cont (10.16.19 @ 12:50) >  Bilateral lower lobe and right middle lobe discoid atelectasis. Mild   cardiomegaly.    Hepatomegaly and hepatic steatosis.          10/15/19 : CT Abdomen and Pelvis w/ IV Cont (10.15.19 @ 11:12)  Small pulmonary consolidation in the medial right middle lobe with air bronchogram may represent pneumonia. Follow-up chest CT in 4-6 weeks is recommended to ensure resolution.  Hepatic steatosis and hepatomegaly again noted.  Mild undulating contour of the liver may represent cirrhosis. Clinical  correlation is recommended. Splenomegaly and mild ascites.    Mild gallbladder wall thickening versus pericholecystic fluid. No CT evidence for gallstone. If there is a clinical suspicion for acute  cholecystitis, HIDA scan may be pursued for further evaluation.  Mild common bile duct dilatation.    Mild stranding adjacent to the pancreatic head and duodenum may represent  acute pancreatitis or nonspecific duodenitis. Clinical correlation with  pancreatic enzymes is recommended. No evidence for pancreatic necrosis. Mildly enlarged 1.4 cm peripancreatic lymph node.      10/14/19 : MR MRCP w/wo IV Cont (10.14.19 @ 17:21) Small volume upper abdominal ascites, of uncertain etiology. Consider further evaluation with contrast enhanced CT abdomen and pelvis. Dilated common bile duct, measuring 0.9 cm. No cholelithiasis or choledocholithiasis.      10/13/19 : US Hepatic & Pancreatic (10.13.19 @ 16:38)   No cholelithiasis. Dilated common bile duct. A definite obstructing calculus is not visualized. MRI/MRCP suggested for further   evaluation.        MICROBIOLOGY DATA:    Culture - Blood (10.13.19 @ 14:30)    Specimen Source: .Blood    Culture Results:   No growth to date.    Culture - Blood (10.13.19 @ 14:30)    Specimen Source: .Blood    Culture Results:   No growth to date.      Culture - Urine (10.13.19 @ 21:58)    Specimen Source: .Urine    Culture Results:   <10,000 CFU/mL Normal Urogenital Daysi                    Assessment and Plan:   · Assessment		  Patient is a 39y old  Female  with PMH of DM presents to the ER for evaluation of fever and RUQ abdominal pain. Pt states that she went for a family visit to [place] the last week when she started spiking fever of 101-102 F everyday. 2 days later she developed  RUQ abdominal pain.  The pain is not related to food intake though the symptoms started after feast during the festival which did consist of fatty food. Pt went to ED in [place] and got US Abdomen done which showed no GB stones but some pericholecystic fluid and LFTs were elevate. In ER, she found to have fever, T 102 F and tachycardia. The Labs significant for elevated LFTS with US showing dilated CBD with no stone visible. She was given 1 dose of zosyn, and The ID consult requested to assist with further evaluation and antibiotic management.     # Sepsis ( fever + tachycardia )  # Cholangitis -   # RML consolidation  # acute pancreatitis or nonspecific duodenitis.     would recommend:    1. Follow up HIDA scan  2. Continue zosyn inpatient and May change to oral Levaquin 500 mg daily and Flagyl  500 mg q 8 hourson discharge to complete the 10 days course  3. OOB to chair    d/w Covering NP< Shai  will follow the patient with you Patient remains  afebrile. The LFTS are trending up. The WBC count and creatinine stay normal. The HIDA Scan is negative.        REVIEW OF SYSTEMS: All other review systems are negative        ALLERGIES: No Known Allergies      Vital Signs Last 24 Hrs  T(C): 36.9 (17 Oct 2019 13:44), Max: 36.9 (17 Oct 2019 13:44)  T(F): 98.5 (17 Oct 2019 13:44), Max: 98.5 (17 Oct 2019 13:44)  HR: 74 (17 Oct 2019 13:44) (67 - 74)  BP: 112/71 (17 Oct 2019 13:44) (102/65 - 112/71)  BP(mean): --  RR: 16 (17 Oct 2019 13:44) (16 - 16)  SpO2: 100% (17 Oct 2019 13:44) (97% - 100%)        LABS:                         12.1   8.43  )-----------( 281      ( 17 Oct 2019 07:35 )             37.5                           11.6   8.77  )-----------( 266      ( 15 Oct 2019 08:19 )             35.9       10-17    138  |  108  |  8   ----------------------------<  143<H>  3.8   |  23  |  0.67    Ca    8.4      17 Oct 2019 07:35    TPro  7.6  /  Alb  3.0<L>  /  TBili  0.6  /  DBili  x   /  AST  442<H>  /  ALT  610<H>  /  AlkPhos  147<H>  10-17    10-16    134<L>  |  103  |  6<L>  ----------------------------<  131<H>  3.6   |  25  |  0.74    Ca    8.3<L>      16 Oct 2019 07:16    TPro  7.5  /  Alb  3.0<L>  /  TBili  0.7  /  DBili  x   /  AST  360<H>  /  ALT  585<H>  /  AlkPhos  142<H>  10-16          CAPILLARY BLOOD GLUCOSE  POCT Blood Glucose.: 104 mg/dL (14 Oct 2019 18:13)  POCT Blood Glucose.: 99 mg/dL (14 Oct 2019 11:44)  POCT Blood Glucose.: 110 mg/dL (14 Oct 2019 05:59)  POCT Blood Glucose.: 118 mg/dL (14 Oct 2019 00:16)        Urinalysis Basic - ( 13 Oct 2019 13:05 )  Color: Yellow / Appearance: Clear / S.010 / pH: x  Gluc: x / Ketone: Negative  / Bili: Negative / Urobili: Negative   Blood: x / Protein: Negative / Nitrite: Negative   Leuk Esterase: Negative / RBC: x / WBC x   Sq Epi: x / Non Sq Epi: x / Bacteria: x        MEDICATIONS  (STANDING):  dextrose 5%. 1000 milliLiter(s) (50 mL/Hr) IV Continuous <Continuous>  dextrose 50% Injectable 12.5 Gram(s) IV Push once  dextrose 50% Injectable 25 Gram(s) IV Push once  dextrose 50% Injectable 25 Gram(s) IV Push once  insulin lispro (HumaLOG) corrective regimen sliding scale   SubCutaneous three times a day before meals  insulin lispro (HumaLOG) corrective regimen sliding scale   SubCutaneous at bedtime  melatonin 3 milliGRAM(s) Oral at bedtime  pantoprazole    Tablet 40 milliGRAM(s) Oral before breakfast  piperacillin/tazobactam IVPB.. 3.375 Gram(s) IV Intermittent every 8 hours  sodium chloride 0.9%. 1000 milliLiter(s) (50 mL/Hr) IV Continuous <Continuous>    MEDICATIONS  (PRN):  dextrose 40% Gel 15 Gram(s) Oral once PRN Blood Glucose LESS THAN 70 milliGRAM(s)/deciliter  glucagon  Injectable 1 milliGRAM(s) IntraMuscular once PRN Glucose LESS THAN 70 milligrams/deciliter  ibuprofen  Tablet. 200 milliGRAM(s) Oral three times a day PRN Mild Pain (1 - 3)        RADIOLOGY & ADDITIONAL TESTS:      HIDA SCAN: NM Hepatobiliary Imaging (10.17.19 @ 11:24)  Normal hepatobiliary scan.  No scan evidence of acute cholecystitis.     10/16/19 : CT Chest No Cont (10.16.19 @ 12:50) Bilateral lower lobe and right middle lobe discoid atelectasis. Mild cardiomegaly. Hepatomegaly and hepatic steatosis.      10/15/19 : CT Abdomen and Pelvis w/ IV Cont (10.15.19 @ 11:12)  Small pulmonary consolidation in the medial right middle lobe with air bronchogram may represent pneumonia. Follow-up chest CT in 4-6 weeks is recommended to ensure resolution.  Hepatic steatosis and hepatomegaly again noted.  Mild undulating contour of the liver may represent cirrhosis. Clinical  correlation is recommended. Splenomegaly and mild ascites.    Mild gallbladder wall thickening versus pericholecystic fluid. No CT evidence for gallstone. If there is a clinical suspicion for acute  cholecystitis, HIDA scan may be pursued for further evaluation.  Mild common bile duct dilatation.    Mild stranding adjacent to the pancreatic head and duodenum may represent  acute pancreatitis or nonspecific duodenitis. Clinical correlation with  pancreatic enzymes is recommended. No evidence for pancreatic necrosis. Mildly enlarged 1.4 cm peripancreatic lymph node.      10/14/19 : MR MRCP w/wo IV Cont (10.14.19 @ 17:21) Small volume upper abdominal ascites, of uncertain etiology. Consider further evaluation with contrast enhanced CT abdomen and pelvis. Dilated common bile duct, measuring 0.9 cm. No cholelithiasis or choledocholithiasis.      10/13/19 : US Hepatic & Pancreatic (10.13.19 @ 16:38)   No cholelithiasis. Dilated common bile duct. A definite obstructing calculus is not visualized. MRI/MRCP suggested for further   evaluation.        MICROBIOLOGY DATA:    Culture - Blood (10.13.19 @ 14:30)    Specimen Source: .Blood    Culture Results:   No growth to date.    Culture - Blood (10.13.19 @ 14:30)    Specimen Source: .Blood    Culture Results:   No growth to date.      Culture - Urine (10.13.19 @ 21:58)    Specimen Source: .Urine    Culture Results:   <10,000 CFU/mL Normal Urogenital Daysi

## 2019-10-17 NOTE — PROGRESS NOTE ADULT - NEGATIVE GASTROINTESTINAL SYMPTOMS
no melena/no jaundice/no vomiting/no hematochezia/no nausea
no nausea/no melena/no jaundice/no hematochezia/no vomiting
no nausea/no vomiting/no melena/no hematochezia/no jaundice
no nausea/no vomiting/no melena/no jaundice/no hematochezia

## 2019-10-17 NOTE — PROGRESS NOTE ADULT - PROBLEM SELECTOR PROBLEM 6
Need for prophylactic measure
Need for prophylactic measure
R/O Cholangitis
Discharge planning issues

## 2019-10-17 NOTE — PROGRESS NOTE ADULT - PROBLEM SELECTOR PROBLEM 2
Right upper quadrant abdominal pain
Pneumonia
Right upper quadrant abdominal pain

## 2019-10-17 NOTE — PROGRESS NOTE ADULT - PROVIDER SPECIALTY LIST ADULT
Gastroenterology
Infectious Disease
Infectious Disease
Internal Medicine
Pulmonology
Infectious Disease
Internal Medicine
Internal Medicine
Gastroenterology

## 2019-10-17 NOTE — PROGRESS NOTE ADULT - PROBLEM SELECTOR PLAN 6
blood culture negative  CT abdomen noted  GI follow up  ID follow up. blood culture negative  CT abdomen noted  GI follow up  ID follow up.  Check HIDA scan results

## 2019-10-17 NOTE — PROGRESS NOTE ADULT - NEGATIVE HEMATOLOGY SYMPTOMS
no gum bleeding/no nose bleeding
no gum bleeding/no nose bleeding
no nose bleeding/no gum bleeding
no gum bleeding/no nose bleeding

## 2019-10-17 NOTE — DISCHARGE NOTE NURSING/CASE MANAGEMENT/SOCIAL WORK - NSDCFUADDAPPT_GEN_ALL_CORE_FT
Follow-up with your primary care physician and Gastroenterologist within 1 week. Call for appointment.    you have an apt with Dr. Kvng Noble on monday 10/21/19 at 1030AM at [address] [facility]

## 2019-10-17 NOTE — PROGRESS NOTE ADULT - ASSESSMENT
Patient is a 39y old  Female  with PMH of DM presents to the ER for evaluation of fever and RUQ abdominal pain. Pt states that she went for a family visit to [place] the last week when she started spiking fever of 101-102 F everyday. 2 days later she developed  RUQ abdominal pain.  The pain is not related to food intake though the symptoms started after feast during the festival which did consist of fatty food. Pt went to ED in [place] and got US Abdomen done which showed no GB stones but some pericholecystic fluid and LFTs were elevate. In ER, she found to have fever, T 102 F and tachycardia. The Labs significant for elevated LFTS with US showing dilated CBD with no stone visible. She was given 1 dose of zosyn, and The ID consult requested to assist with further evaluation and antibiotic management.     # Sepsis ( fever + tachycardia )  # Cholangitis -   # RML consolidation  # acute pancreatitis or nonspecific duodenitis.     would recommend:    1. Follow up Lfts  2. Continue zosyn inpatient and May change to oral Levaquin 500 mg daily and Flagyl  500 mg q 8 hours on discharge to complete the 10 days course  3. OOB to chair    d/w Covering Shai CORBIN

## 2019-10-17 NOTE — PROGRESS NOTE ADULT - REASON FOR ADMISSION
Fever and RUQ abdominal pain X 6 days

## 2019-10-17 NOTE — PROGRESS NOTE ADULT - SUBJECTIVE AND OBJECTIVE BOX
Patient is a 39y old  Female who presents with a chief complaint of Fever and RUQ abdominal pain X 6 days (16 Oct 2019 20:24)    Awake, alert, lying in bed in NAD. C/o occasional cough but no sob at this moment.    INTERVAL HPI/OVERNIGHT EVENTS:      VITAL SIGNS:  T(F): 97.2 (10-17-19 @ 05:27)  HR: 74 (10-17-19 @ 05:27)  BP: 106/72 (10-17-19 @ 05:27)  RR: 16 (10-17-19 @ 05:27)  SpO2: 97% (10-17-19 @ 05:27)  Wt(kg): --  I&O's Detail          REVIEW OF SYSTEMS:    CONSTITUTIONAL:  No fevers, chills, sweats    HEENT:  Eyes:  No diplopia or blurred vision. ENT:  No earache, sore throat or runny nose.    CARDIOVASCULAR:  No pressure, squeezing, tightness, or heaviness about the chest; no palpitations.    RESPIRATORY:  Per HPI    GASTROINTESTINAL:  No abdominal pain, nausea, vomiting or diarrhea.    GENITOURINARY:  No dysuria, frequency or urgency.    NEUROLOGIC:  No paresthesias, fasciculations, seizures or weakness.    PSYCHIATRIC:  No disorder of thought or mood.      PHYSICAL EXAM:    Constitutional: Well developed and nourished  Eyes:Perrla  ENMT: normal  Neck:supple  Respiratory: + Bibasillar rales  Cardiovascular: S1 S2 regular  Gastrointestinal: Soft, Non tender  Extremities: No edema  Vascular:normal  Neurological:Awake, alert,Ox3  Musculoskeletal:Normal      MEDICATIONS  (STANDING):  dextrose 5%. 1000 milliLiter(s) (50 mL/Hr) IV Continuous <Continuous>  dextrose 50% Injectable 12.5 Gram(s) IV Push once  dextrose 50% Injectable 25 Gram(s) IV Push once  dextrose 50% Injectable 25 Gram(s) IV Push once  insulin lispro (HumaLOG) corrective regimen sliding scale   SubCutaneous three times a day before meals  insulin lispro (HumaLOG) corrective regimen sliding scale   SubCutaneous at bedtime  melatonin 3 milliGRAM(s) Oral at bedtime  pantoprazole    Tablet 40 milliGRAM(s) Oral before breakfast  piperacillin/tazobactam IVPB.. 3.375 Gram(s) IV Intermittent every 8 hours  sodium chloride 0.9%. 1000 milliLiter(s) (50 mL/Hr) IV Continuous <Continuous>    MEDICATIONS  (PRN):  dextrose 40% Gel 15 Gram(s) Oral once PRN Blood Glucose LESS THAN 70 milliGRAM(s)/deciliter  glucagon  Injectable 1 milliGRAM(s) IntraMuscular once PRN Glucose LESS THAN 70 milligrams/deciliter  ibuprofen  Tablet. 200 milliGRAM(s) Oral three times a day PRN Mild Pain (1 - 3)      Allergies    No Known Allergies    Intolerances        LABS:                        12.1   8.43  )-----------( 281      ( 17 Oct 2019 07:35 )             37.5     10-17    138  |  108  |  x   ----------------------------<  143<H>  3.8   |  x   |  x     Ca    8.3<L>      16 Oct 2019 07:16    TPro  7.5  /  Alb  3.0<L>  /  TBili  0.7  /  DBili  x   /  AST  360<H>  /  ALT  585<H>  /  AlkPhos  142<H>  10-16              CAPILLARY BLOOD GLUCOSE      POCT Blood Glucose.: 131 mg/dL (17 Oct 2019 08:09)  POCT Blood Glucose.: 172 mg/dL (16 Oct 2019 21:30)  POCT Blood Glucose.: 167 mg/dL (16 Oct 2019 16:30)  POCT Blood Glucose.: 165 mg/dL (16 Oct 2019 11:37)        RADIOLOGY & ADDITIONAL TESTS:    < from: CT Abdomen and Pelvis w/ IV Cont (10.15.19 @ 11:12) >  Impression: Small pulmonary consolidation in the medial right middle lobe   with air bronchogram may represent pneumonia. Follow-up chest CT in 4-6   weeks is recommended to ensure resolution.    Hepatic steatosis and hepatomegaly again noted.    Mild undulating contour of the liver may represent cirrhosis. Clinical   correlation is recommended. Splenomegaly and mild ascites.    Mild gallbladder wall thickening versus pericholecystic fluid. No CT   evidence for gallstone. If there is a clinical suspicion for acute   cholecystitis, HIDA scan may be pursued for further evaluation.     Mild common bile duct dilatation.    Mild stranding adjacent to the pancreatic head and duodenum may represent   acute pancreatitis or nonspecific duodenitis. Clinical correlation with   pancreatic enzymes is recommended. No evidence for pancreatic necrosis.    Mildly enlarged 1.4 cm peripancreatic lymph node.      < end of copied text >      CXR:    Ct scan chest:  < from: CT Abdomen and Pelvis w/ IV Cont (10.15.19 @ 11:12) >  Findings:Limited sections through the lung bases demonstrate trace left   pleural effusion. Small bilateral atelectasis. Small consolidation in the   medial right middle lobe with air bronchogram.    < end of copied text >    ekg;    echo: Patient is a 39y old  Female who presents with a chief complaint of Fever and RUQ abdominal pain X 6 days (16 Oct 2019 20:24)    Awake, alert, lying in bed in NAD. C/o occasional cough but no sob at this moment. For HIDA scan this am    INTERVAL HPI/OVERNIGHT EVENTS:      VITAL SIGNS:  T(F): 97.2 (10-17-19 @ 05:27)  HR: 74 (10-17-19 @ 05:27)  BP: 106/72 (10-17-19 @ 05:27)  RR: 16 (10-17-19 @ 05:27)  SpO2: 97% (10-17-19 @ 05:27)  Wt(kg): --  I&O's Detail          REVIEW OF SYSTEMS:    CONSTITUTIONAL:  No fevers, chills, sweats    HEENT:  Eyes:  No diplopia or blurred vision. ENT:  No earache, sore throat or runny nose.    CARDIOVASCULAR:  No pressure, squeezing, tightness, or heaviness about the chest; no palpitations.    RESPIRATORY:  Per HPI    GASTROINTESTINAL:  No abdominal pain, nausea, vomiting or diarrhea.    GENITOURINARY:  No dysuria, frequency or urgency.    NEUROLOGIC:  No paresthesias, fasciculations, seizures or weakness.    PSYCHIATRIC:  No disorder of thought or mood.      PHYSICAL EXAM:    Constitutional: Well developed and nourished  Eyes:Perrla  ENMT: normal  Neck:supple  Respiratory: + Bibasillar rales  Cardiovascular: S1 S2 regular  Gastrointestinal: Soft, Non tender  Extremities: No edema  Vascular:normal  Neurological:Awake, alert,Ox3  Musculoskeletal:Normal      MEDICATIONS  (STANDING):  dextrose 5%. 1000 milliLiter(s) (50 mL/Hr) IV Continuous <Continuous>  dextrose 50% Injectable 12.5 Gram(s) IV Push once  dextrose 50% Injectable 25 Gram(s) IV Push once  dextrose 50% Injectable 25 Gram(s) IV Push once  insulin lispro (HumaLOG) corrective regimen sliding scale   SubCutaneous three times a day before meals  insulin lispro (HumaLOG) corrective regimen sliding scale   SubCutaneous at bedtime  melatonin 3 milliGRAM(s) Oral at bedtime  pantoprazole    Tablet 40 milliGRAM(s) Oral before breakfast  piperacillin/tazobactam IVPB.. 3.375 Gram(s) IV Intermittent every 8 hours  sodium chloride 0.9%. 1000 milliLiter(s) (50 mL/Hr) IV Continuous <Continuous>    MEDICATIONS  (PRN):  dextrose 40% Gel 15 Gram(s) Oral once PRN Blood Glucose LESS THAN 70 milliGRAM(s)/deciliter  glucagon  Injectable 1 milliGRAM(s) IntraMuscular once PRN Glucose LESS THAN 70 milligrams/deciliter  ibuprofen  Tablet. 200 milliGRAM(s) Oral three times a day PRN Mild Pain (1 - 3)      Allergies    No Known Allergies    Intolerances        LABS:                        12.1   8.43  )-----------( 281      ( 17 Oct 2019 07:35 )             37.5     10-17    138  |  108  |  x   ----------------------------<  143<H>  3.8   |  x   |  x     Ca    8.3<L>      16 Oct 2019 07:16    TPro  7.5  /  Alb  3.0<L>  /  TBili  0.7  /  DBili  x   /  AST  360<H>  /  ALT  585<H>  /  AlkPhos  142<H>  10-16          Culture - Blood (10.13.19 @ 14:30)    Specimen Source: .Blood    Culture Results:   No growth to date.        CAPILLARY BLOOD GLUCOSE      POCT Blood Glucose.: 131 mg/dL (17 Oct 2019 08:09)  POCT Blood Glucose.: 172 mg/dL (16 Oct 2019 21:30)  POCT Blood Glucose.: 167 mg/dL (16 Oct 2019 16:30)  POCT Blood Glucose.: 165 mg/dL (16 Oct 2019 11:37)        RADIOLOGY & ADDITIONAL TESTS:    < from: CT Abdomen and Pelvis w/ IV Cont (10.15.19 @ 11:12) >  Impression: Small pulmonary consolidation in the medial right middle lobe   with air bronchogram may represent pneumonia. Follow-up chest CT in 4-6   weeks is recommended to ensure resolution.    Hepatic steatosis and hepatomegaly again noted.    Mild undulating contour of the liver may represent cirrhosis. Clinical   correlation is recommended. Splenomegaly and mild ascites.    Mild gallbladder wall thickening versus pericholecystic fluid. No CT   evidence for gallstone. If there is a clinical suspicion for acute   cholecystitis, HIDA scan may be pursued for further evaluation.     Mild common bile duct dilatation.    Mild stranding adjacent to the pancreatic head and duodenum may represent   acute pancreatitis or nonspecific duodenitis. Clinical correlation with   pancreatic enzymes is recommended. No evidence for pancreatic necrosis.    Mildly enlarged 1.4 cm peripancreatic lymph node.      < end of copied text >      CXR:    Ct scan chest:  < from: CT Abdomen and Pelvis w/ IV Cont (10.15.19 @ 11:12) >  Findings:Limited sections through the lung bases demonstrate trace left   pleural effusion. Small bilateral atelectasis. Small consolidation in the   medial right middle lobe with air bronchogram.    < end of copied text >    ekg;    echo:

## 2019-10-17 NOTE — PROGRESS NOTE ADULT - PROBLEM SELECTOR PROBLEM 5
DM (diabetes mellitus)
DM (diabetes mellitus)
Transaminitis
Need for prophylactic measure
Need for prophylactic measure

## 2019-10-18 ENCOUNTER — TRANSCRIPTION ENCOUNTER (OUTPATIENT)
Age: 39
End: 2019-10-18

## 2019-10-18 PROBLEM — Z00.00 ENCOUNTER FOR PREVENTIVE HEALTH EXAMINATION: Status: ACTIVE | Noted: 2019-10-18

## 2019-10-18 LAB
CULTURE RESULTS: SIGNIFICANT CHANGE UP
CULTURE RESULTS: SIGNIFICANT CHANGE UP
SPECIMEN SOURCE: SIGNIFICANT CHANGE UP
SPECIMEN SOURCE: SIGNIFICANT CHANGE UP

## 2019-10-21 ENCOUNTER — APPOINTMENT (OUTPATIENT)
Dept: HEPATOLOGY | Facility: CLINIC | Age: 39
End: 2019-10-21
Payer: COMMERCIAL

## 2019-10-21 VITALS
DIASTOLIC BLOOD PRESSURE: 73 MMHG | RESPIRATION RATE: 16 BRPM | HEART RATE: 95 BPM | WEIGHT: 142 LBS | TEMPERATURE: 97.7 F | SYSTOLIC BLOOD PRESSURE: 105 MMHG | HEIGHT: 61 IN | OXYGEN SATURATION: 99 % | BODY MASS INDEX: 26.81 KG/M2

## 2019-10-21 DIAGNOSIS — B17.9 ACUTE VIRAL HEPATITIS, UNSPECIFIED: ICD-10-CM

## 2019-10-21 DIAGNOSIS — F17.200 NICOTINE DEPENDENCE, UNSPECIFIED, UNCOMPLICATED: ICD-10-CM

## 2019-10-21 DIAGNOSIS — R74.8 ABNORMAL LEVELS OF OTHER SERUM ENZYMES: ICD-10-CM

## 2019-10-21 PROCEDURE — 99204 OFFICE O/P NEW MOD 45 MIN: CPT

## 2019-10-21 RX ORDER — LEVOFLOXACIN 500 MG/1
500 TABLET, FILM COATED ORAL
Refills: 0 | Status: ACTIVE | COMMUNITY

## 2019-10-21 RX ORDER — METRONIDAZOLE 500 MG/1
TABLET, FILM COATED ORAL
Refills: 0 | Status: ACTIVE | COMMUNITY

## 2019-10-22 LAB
HAV IGM SER QL: NONREACTIVE
HBV CORE IGG+IGM SER QL: NONREACTIVE
HBV CORE IGM SER QL: NONREACTIVE
HBV SURFACE AB SER QL: REACTIVE
HBV SURFACE AG SER QL: NONREACTIVE
HEPATITIS A IGG ANTIBODY: REACTIVE
IRON SATN MFR SERPL: 17 %
IRON SERPL-MCNC: 53 UG/DL
TIBC SERPL-MCNC: 310 UG/DL
UIBC SERPL-MCNC: 257 UG/DL

## 2019-10-23 PROBLEM — B17.9 ACUTE HEPATITIS: Status: ACTIVE | Noted: 2019-10-21

## 2019-10-23 LAB
ALBUMIN SERPL ELPH-MCNC: 4.2 G/DL
ALP BLD-CCNC: 152 U/L
ALT SERPL-CCNC: 330 U/L
ANION GAP SERPL CALC-SCNC: 17 MMOL/L
AST SERPL-CCNC: 187 U/L
BILIRUB SERPL-MCNC: 0.4 MG/DL
BUN SERPL-MCNC: 9 MG/DL
CALCIUM SERPL-MCNC: 9.6 MG/DL
CHLORIDE SERPL-SCNC: 100 MMOL/L
CMV DNA SPEC QL NAA+PROBE: NOT DETECTED
CMVPCR LOG: NOT DETECTED LOGIU/ML
CO2 SERPL-SCNC: 20 MMOL/L
CREAT SERPL-MCNC: 0.68 MG/DL
DEPRECATED KAPPA LC FREE/LAMBDA SER: 1.62 RATIO
EBV DNA SERPL NAA+PROBE-ACNC: NOT DETECTED IU/ML
EBVPCR LOG: NOT DETECTED LOGIU/ML
GLUCOSE SERPL-MCNC: 194 MG/DL
IGA SER QL IEP: 416 MG/DL
IGG SER QL IEP: 2214 MG/DL
IGM SER QL IEP: 139 MG/DL
KAPPA LC CSF-MCNC: 2.2 MG/DL
KAPPA LC SERPL-MCNC: 3.56 MG/DL
MITOCHONDRIA AB SER IF-ACNC: NORMAL
POTASSIUM SERPL-SCNC: 5 MMOL/L
PROT SERPL-MCNC: 8.1 G/DL
SMOOTH MUSCLE AB SER QL IF: NORMAL
SODIUM SERPL-SCNC: 137 MMOL/L
TTG IGA SER IA-ACNC: <1.2 U/ML
TTG IGA SER-ACNC: NEGATIVE

## 2019-10-23 NOTE — HISTORY OF PRESENT ILLNESS
[IV Drug Use] : no IV drug use [Tattoo] : no tattoos [Body Piercing] : no body piercing [Transfusion before 1992] : no transfusion before 1992 [Hemodialysis] : no hemodialysis [Transplant before 1992] : no transplant before 1992 [Alcohol Abuse] : no alcohol abuse [Autoimmune Disorder] : no autoimmune disorder [Occupational Exposure] : no occupational exposure [Household Contact to HBV] : no household contact to HBV [Cocaine Use] : no cocaine use [de-identified] : A 39 year-old born in [facility], with history of DM on Metformin is seen for elevated liver enzymes likely acute hepatitis. \par \par Patient had a regular check up on 10/4.  AST 26, ALT 41, ALP 71, TB 0.3, HDL 34 , , , anti-HCV negative, A1C 7.2. She was in [place] for her work about few weeks ago.  \par \par She flew to [place] on the same day and developed fever, chills and took acetaminophen 4 tablets daily for 4 days, and developed RUQ pain 2 days later, seen at local hospital in [place], underwent an abdominal US on 10/11/ 2019 which reported  enlarged liver and fatty liver, enlarged reactive alfa hepatic node. \par \[facility] BW from 10/11/2019 amylase 19,  , , TB normal, CRP elevated, CR normal,  \par She flew back to [place] and checked in [facility] on 10/13,  hospitalized at [facility] on 10/13 -10/17. \par \[facility] BW from 10/13 showed , , , TB 0.6 INR 1.57, She had abdominal US, and abdominal MRI.  \par \par 10/14/2019 abdominal MRI  showed hepatic steatosis, periportal edema, dilated CBD of 0.9 cm, GB normal, pancreas normal, trace abdominal ascites \par \par 10/17/2019 HIDA scan was normal. , , , TB 0.6, \par \par She was treated with Flagyl, and Levaquin for pneumonia and presumed cholangitis, still have 2 more days to complete treatment. \par She denies use of over the counter herbal products, or supplements except Vit D and calcium supplements. \par \par She has no appetite, abdominal gas discomfort, but denies abdominal pain,  nausea, or vomiting, or GI bleeding, dark color urine, pruritus, diarrhea, or skin rash.

## 2019-10-23 NOTE — ASSESSMENT
[FreeTextEntry1] : A 39 year-old born in [facility], with history of DM on Metformin is seen for elevated liver enzymes likely acute hepatitis. \par \par Patient had a regular check up on 10/4, ALT was mild elevated but developed fever and chills and subsequently RUQ pain while travelling in [place]. Blood work then showed ALT up to 509, abdominal US then showed enlarged liver and fatty liver, enlarged reactive alfa hepatic node. \par \par She was hospitalized at [facility]. 10/14/2019 abdominal MRI  showed hepatic steatosis, periportal edema, dilated CBD of 0.9 cm, GB normal, pancreas normal, trace abdominal ascites.\par 10/17/2019 HIDA scan was normal. \par \par She was treated with Flagyl, and Levaquin for pneumonia and presumed cholangitis, still have 2 more days to complete treatment. \par \par She likely has acute hepatitis, possible viral illness or infection, or biliary tract disease with passed CBD stone but normal ALP and TB treated with antibiotics for presumed cholangitis,  and unlikely fatty liver with CALLAHAN. \par \par I have explained to the patient the differential diagnosis of elevated liver enzymes and  acute liver injury/acute hepatitis.\par \par I  have requested blood work to screen viral hepatitis, infection,  immune mediated liver disease, hereditary liver disease, and celiac disease and to trend liver tests as well. \par \par I have advised patient to call for above results and recommended avoidance of hepatotoxic agents and a return visit to ER if her condition deteriorates, or return to office for followup in 1 month.

## 2019-10-23 NOTE — REVIEW OF SYSTEMS
[Feeling Tired] : feeling tired [As Noted in HPI] : as noted in HPI [Negative] : Neurological [Fever] : no fever [Chills] : no chills [Recent Weight Gain (___ Lbs)] : no recent weight gain [Recent Weight Loss (___ Lbs)] : no recent weight loss [Eye Pain] : no eye pain [Dry Eyes] : no dryness of the eyes [Shortness Of Breath] : no shortness of breath [Wheezing] : no wheezing [Cough] : no cough [Dysuria] : no dysuria [Joint Pain] : no joint pain [Limb Pain] : no limb pain [Limb Swelling] : no limb swelling [Itching] : no itching [Depression] : no depression [Anxiety] : no anxiety [Proptosis] : no proptosis [Muscle Weakness] : no muscle weakness [Feelings Of Weakness] : no feelings of weakness

## 2019-10-23 NOTE — PHYSICAL EXAM
[Cognitive Mini-Mental Status Normal?] : Cognitive Mini Mental Status Exam is normal [General Appearance - Alert] : alert [General Appearance - In No Acute Distress] : in no acute distress [General Appearance - Well Nourished] : well nourished [Sclera] : the sclera and conjunctiva were normal [General Appearance - Well Developed] : well developed [Oropharynx] : the oropharynx was normal [Outer Ear] : the ears and nose were normal in appearance [Neck Appearance] : the appearance of the neck was normal [Jugular Venous Distention Increased] : there was no jugular-venous distention [Apical Impulse] : the apical impulse was normal [Auscultation Breath Sounds / Voice Sounds] : lungs were clear to auscultation bilaterally [Heart Sounds] : normal S1 and S2 [Heart Rate And Rhythm] : heart rate was normal and rhythm regular [Edema] : there was no peripheral edema [Murmurs] : no murmurs [Bowel Sounds] : normal bowel sounds [Abdomen Soft] : soft [Cervical Lymph Nodes Enlarged Posterior Bilaterally] : posterior cervical [] : no hepato-splenomegaly [Abdomen Tenderness] : non-tender [No CVA Tenderness] : no ~M costovertebral angle tenderness [Cervical Lymph Nodes Enlarged Anterior Bilaterally] : anterior cervical [Involuntary Movements] : no involuntary movements were seen [No Spinal Tenderness] : no spinal tenderness [Skin Turgor] : normal skin turgor [Oriented To Time, Place, And Person] : oriented to person, place, and time [Scleral Icterus] : No Scleral Icterus [Abdominal  Ascites] : no ascites [Splenomegaly] : no splenomegaly [Liver Palpable ___ Finger Breadths Below Costal Margin] : was not palpable below costal margin [Scrotal Edema] : Scrotum is not edematous [Asterixis] : no asterixis observed [Jaundice] : No jaundice

## 2019-10-23 NOTE — REASON FOR VISIT
[Initial Evaluation] : an initial evaluation [FreeTextEntry1] : elevated liver enzymes likely acute hepatitis

## 2019-10-25 LAB
ANAPLASMA PHAGOCYTO IGM COMENT: NORMAL
ANAPLASMA PHAGOCYTO IGM COMMENT: NORMAL
ANAPLASMA PHAGOCYTOPHILIA IGG ANTIBODIES: NORMAL
ANAPLASMA PHAGOCYTOPHILIA IGM ANTIBODIES: NORMAL
B MICROTI AB SER QL: NORMAL
BABESIA ANTIBODIES, IGG: NORMAL
BABESIA ANTIBODIES, IGM: NORMAL
EHRLICIA CHAFFEENIS IGG ANTIBODIES: NORMAL
EHRLICIA CHAFFEENIS IGG COMMENT: NORMAL
EHRLICIA CHAFFEENIS IGG INTERP: NORMAL
EHRLICIA CHAFFEENIS IGM ANTIBODIES: NORMAL
HERPES SIMPLEX 1 DNA: NOT DETECTED
HERPES SIMPLEX 2 DNA: NOT DETECTED
HERPES SIMPLEX SPECIMEN SOURCE: NORMAL

## 2019-10-28 LAB
HEPATITIS E IGM ABY: NORMAL
SOLUBLE LIVER IGG SER IA-ACNC: < 20.1 UNITS

## 2019-11-03 LAB
MISCELLANEOUS TEST: NORMAL
PROC NAME: NORMAL

## 2020-12-17 NOTE — ED ADULT NURSE NOTE - CAS TRG GENERAL AIRWAY, MLM
Pt calling to sched CE/CLR w/Dr ALFORD, prior to procedure on 1/7(Laparoscopy). Pls call pt back, when can have CLR?  [phone]   Patent

## 2021-06-23 NOTE — ED ADULT NURSE NOTE - NS ED NURSE LEVEL OF CONSCIOUSNESS AFFECT
Pt has a history of diverticulitis and GERD. Colonoscopy 1/2021 showed severe pancolonic diverticulosis and hemorrhoids EGD 9/2020 showed reflux esophagitis and gastritis CT A/P 8/2020 showed diverticulosis. Pt currently denies any GI symptoms. GERD symptoms well controlled with omeprazole. Denies abdominal pain or bowel issues.
Calm

## 2023-04-26 NOTE — PATIENT PROFILE ADULT - DOES PATIENT HAVE ADVANCE DIRECTIVE
no Oculoplastic Surgeon Procedure Text (E): After obtaining clear surgical margins the patient was sent to oculoplastics for surgical repair.  The patient understands they will receive post-surgical care and follow-up from the referring physician's office.

## 2023-09-11 NOTE — ED ADULT NURSE NOTE - NSIMPLEMENTINTERV_GEN_ALL_ED
No indicators present Implemented All Universal Safety Interventions:  [place] to call system. Call bell, personal items and telephone within reach. Instruct patient to call for assistance. Room bathroom lighting operational. Non-slip footwear when patient is off stretcher. Physically safe environment: no spills, clutter or unnecessary equipment. Stretcher in lowest position, wheels locked, appropriate side rails in place.

## 2024-07-05 NOTE — ED ADULT NURSE NOTE - PMH
07/05/24      Elijah Martinez  [address]  [place] [address]      Dear Elijah:    We are committed to helping you live well during and after your hospital stay. Through our Care Transitions Program, we give you and your family individualized support during your transition home.     Upon leaving either the hospital or skilled facility, you will be given a Care   Transitions Nurse. Our priority is to help you with your recovery once you get   home and get back to the things you need and want to do.  Services are free of   charge.    How it Works:    Your Transitions Nurse will call you within 24 to 72 hours after discharge. If helpful to you, a family member may be there for this call. We will review the following items to make sure you have everything you need as part of your discharge plan:    Your written discharge instructions and care plan  Your meds  Your next doctor’s visit(s)  Any health or other concerns    Over the next 30 days, your Transitions Nurse will call you, most often once a week. These calls give you the chance to ask questions about your health care needs.    Your Transitions Nurse can link you with your doctor(s) and services as needed and give helpful facts to help keep you on track throughout your healing.    Your Transitions Nurse is available to you Monday thru Friday, 8am to 4:30pm at [phone].    We look forward to working with you on your health care journey.    Sincerely,    Leslie Bajwa RN  Care Transitions Nurse?   [facility]   DM (diabetes mellitus)

## 2025-07-04 NOTE — ED PROVIDER NOTE - CHIEF COMPLAINT
No treatment necessary continue valsartan  Hold amlodipine and Lasix for now.  Restart if pressure starts to recover     The patient is a 39y Female complaining of fever. Xray Hand 3 Views, Right